# Patient Record
Sex: MALE | Race: WHITE | NOT HISPANIC OR LATINO | Employment: UNEMPLOYED | URBAN - NONMETROPOLITAN AREA
[De-identification: names, ages, dates, MRNs, and addresses within clinical notes are randomized per-mention and may not be internally consistent; named-entity substitution may affect disease eponyms.]

---

## 2017-07-20 ENCOUNTER — HISTORY (OUTPATIENT)
Dept: EMERGENCY MEDICINE | Facility: OTHER | Age: 47
End: 2017-07-20

## 2017-07-21 ENCOUNTER — AMBULATORY - GICH (OUTPATIENT)
Dept: SCHEDULING | Facility: OTHER | Age: 47
End: 2017-07-21

## 2021-10-22 ENCOUNTER — ALLIED HEALTH/NURSE VISIT (OUTPATIENT)
Dept: FAMILY MEDICINE | Facility: OTHER | Age: 51
End: 2021-10-22
Attending: FAMILY MEDICINE
Payer: COMMERCIAL

## 2021-10-22 DIAGNOSIS — R53.83 TIRES EASILY: ICD-10-CM

## 2021-10-22 DIAGNOSIS — Z20.822 COVID-19 RULED OUT: Primary | ICD-10-CM

## 2021-10-22 DIAGNOSIS — R05.9 COUGH: ICD-10-CM

## 2021-10-22 PROCEDURE — U0005 INFEC AGEN DETEC AMPLI PROBE: HCPCS | Mod: ZL

## 2021-10-22 PROCEDURE — C9803 HOPD COVID-19 SPEC COLLECT: HCPCS

## 2021-10-24 ENCOUNTER — APPOINTMENT (OUTPATIENT)
Dept: CT IMAGING | Facility: OTHER | Age: 51
End: 2021-10-24
Attending: PHYSICIAN ASSISTANT
Payer: COMMERCIAL

## 2021-10-24 ENCOUNTER — HOSPITAL ENCOUNTER (EMERGENCY)
Facility: OTHER | Age: 51
Discharge: HOME | End: 2021-10-25
Attending: PHYSICIAN ASSISTANT | Admitting: PHYSICIAN ASSISTANT
Payer: COMMERCIAL

## 2021-10-24 DIAGNOSIS — F10.921 ALCOHOL INTOXICATION WITH DELIRIUM (H): ICD-10-CM

## 2021-10-24 LAB
ALBUMIN SERPL-MCNC: 4.7 G/DL (ref 3.5–5.7)
ALBUMIN UR-MCNC: NEGATIVE MG/DL
ALP SERPL-CCNC: 89 U/L (ref 34–104)
ALT SERPL W P-5'-P-CCNC: 25 U/L (ref 7–52)
AMPHETAMINES UR QL: NOT DETECTED
ANION GAP SERPL CALCULATED.3IONS-SCNC: 16 MMOL/L (ref 3–14)
APPEARANCE UR: CLEAR
AST SERPL W P-5'-P-CCNC: 33 U/L (ref 13–39)
BARBITURATES UR QL SCN: NOT DETECTED
BASOPHILS # BLD AUTO: 0.1 10E3/UL (ref 0–0.2)
BASOPHILS NFR BLD AUTO: 1 %
BENZODIAZ UR QL SCN: NOT DETECTED
BILIRUB SERPL-MCNC: 0.2 MG/DL (ref 0.3–1)
BILIRUB UR QL STRIP: NEGATIVE
BUN SERPL-MCNC: 18 MG/DL (ref 7–25)
BUPRENORPHINE UR QL: NOT DETECTED
CALCIUM SERPL-MCNC: 9.6 MG/DL (ref 8.6–10.3)
CANNABINOIDS UR QL: ABNORMAL
CHLORIDE BLD-SCNC: 108 MMOL/L (ref 98–107)
CO2 SERPL-SCNC: 21 MMOL/L (ref 21–31)
COCAINE UR QL SCN: NOT DETECTED
COLOR UR AUTO: YELLOW
CREAT SERPL-MCNC: 0.83 MG/DL (ref 0.7–1.3)
D-METHAMPHET UR QL: NOT DETECTED
EOSINOPHIL # BLD AUTO: 0.2 10E3/UL (ref 0–0.7)
EOSINOPHIL NFR BLD AUTO: 1 %
ERYTHROCYTE [DISTWIDTH] IN BLOOD BY AUTOMATED COUNT: 13.4 % (ref 10–15)
ETHANOL SERPL-MCNC: 0.31 G/DL
GFR SERPL CREATININE-BSD FRML MDRD: >90 ML/MIN/1.73M2
GLUCOSE BLD-MCNC: 84 MG/DL (ref 70–105)
GLUCOSE UR STRIP-MCNC: NEGATIVE MG/DL
HCT VFR BLD AUTO: 46 % (ref 40–53)
HGB BLD-MCNC: 15.6 G/DL (ref 13.3–17.7)
HGB UR QL STRIP: NEGATIVE
HYALINE CASTS: 2 /LPF
IMM GRANULOCYTES # BLD: 0.1 10E3/UL
IMM GRANULOCYTES NFR BLD: 1 %
KETONES UR STRIP-MCNC: NEGATIVE MG/DL
LACTATE SERPL-SCNC: 3.2 MMOL/L (ref 0.7–2)
LEUKOCYTE ESTERASE UR QL STRIP: NEGATIVE
LIPASE SERPL-CCNC: 102 U/L (ref 11–82)
LYMPHOCYTES # BLD AUTO: 4.2 10E3/UL (ref 0.8–5.3)
LYMPHOCYTES NFR BLD AUTO: 24 %
MAGNESIUM SERPL-MCNC: 1.8 MG/DL (ref 1.9–2.7)
MCH RBC QN AUTO: 29.9 PG (ref 26.5–33)
MCHC RBC AUTO-ENTMCNC: 33.9 G/DL (ref 31.5–36.5)
MCV RBC AUTO: 88 FL (ref 78–100)
METHADONE UR QL SCN: NOT DETECTED
MONOCYTES # BLD AUTO: 1.2 10E3/UL (ref 0–1.3)
MONOCYTES NFR BLD AUTO: 7 %
MUCOUS THREADS #/AREA URNS LPF: PRESENT /LPF
NEUTROPHILS # BLD AUTO: 11.6 10E3/UL (ref 1.6–8.3)
NEUTROPHILS NFR BLD AUTO: 66 %
NITRATE UR QL: NEGATIVE
NRBC # BLD AUTO: 0 10E3/UL
NRBC BLD AUTO-RTO: 0 /100
OPIATES UR QL SCN: NOT DETECTED
OXYCODONE UR QL SCN: NOT DETECTED
PCP UR QL SCN: NOT DETECTED
PH UR STRIP: 5 [PH] (ref 5–9)
PLATELET # BLD AUTO: 257 10E3/UL (ref 150–450)
POTASSIUM BLD-SCNC: 3.7 MMOL/L (ref 3.5–5.1)
PROPOXYPH UR QL: NOT DETECTED
PROT SERPL-MCNC: 7.2 G/DL (ref 6.4–8.9)
RBC # BLD AUTO: 5.21 10E6/UL (ref 4.4–5.9)
RBC URINE: 0 /HPF
SARS-COV-2 RNA RESP QL NAA+PROBE: NEGATIVE
SARS-COV-2 RNA RESP QL NAA+PROBE: NEGATIVE
SODIUM SERPL-SCNC: 145 MMOL/L (ref 134–144)
SP GR UR STRIP: 1.01 (ref 1–1.03)
TRICYCLICS UR QL SCN: NOT DETECTED
UROBILINOGEN UR STRIP-MCNC: NORMAL MG/DL
WBC # BLD AUTO: 17.3 10E3/UL (ref 4–11)
WBC URINE: <1 /HPF

## 2021-10-24 PROCEDURE — 99285 EMERGENCY DEPT VISIT HI MDM: CPT | Mod: 25 | Performed by: PHYSICIAN ASSISTANT

## 2021-10-24 PROCEDURE — 96366 THER/PROPH/DIAG IV INF ADDON: CPT | Performed by: PHYSICIAN ASSISTANT

## 2021-10-24 PROCEDURE — 96372 THER/PROPH/DIAG INJ SC/IM: CPT | Performed by: PHYSICIAN ASSISTANT

## 2021-10-24 PROCEDURE — 82077 ASSAY SPEC XCP UR&BREATH IA: CPT | Performed by: PHYSICIAN ASSISTANT

## 2021-10-24 PROCEDURE — 83690 ASSAY OF LIPASE: CPT | Performed by: PHYSICIAN ASSISTANT

## 2021-10-24 PROCEDURE — 72125 CT NECK SPINE W/O DYE: CPT

## 2021-10-24 PROCEDURE — 99284 EMERGENCY DEPT VISIT MOD MDM: CPT | Performed by: PHYSICIAN ASSISTANT

## 2021-10-24 PROCEDURE — 81001 URINALYSIS AUTO W/SCOPE: CPT | Mod: XU | Performed by: PHYSICIAN ASSISTANT

## 2021-10-24 PROCEDURE — 80306 DRUG TEST PRSMV INSTRMNT: CPT | Performed by: PHYSICIAN ASSISTANT

## 2021-10-24 PROCEDURE — 36415 COLL VENOUS BLD VENIPUNCTURE: CPT | Performed by: PHYSICIAN ASSISTANT

## 2021-10-24 PROCEDURE — 96360 HYDRATION IV INFUSION INIT: CPT | Performed by: PHYSICIAN ASSISTANT

## 2021-10-24 PROCEDURE — C9803 HOPD COVID-19 SPEC COLLECT: HCPCS | Performed by: PHYSICIAN ASSISTANT

## 2021-10-24 PROCEDURE — 258N000003 HC RX IP 258 OP 636: Performed by: PHYSICIAN ASSISTANT

## 2021-10-24 PROCEDURE — 85025 COMPLETE CBC W/AUTO DIFF WBC: CPT | Performed by: PHYSICIAN ASSISTANT

## 2021-10-24 PROCEDURE — 250N000009 HC RX 250: Performed by: PHYSICIAN ASSISTANT

## 2021-10-24 PROCEDURE — 83735 ASSAY OF MAGNESIUM: CPT | Performed by: PHYSICIAN ASSISTANT

## 2021-10-24 PROCEDURE — 96361 HYDRATE IV INFUSION ADD-ON: CPT | Performed by: PHYSICIAN ASSISTANT

## 2021-10-24 PROCEDURE — 82040 ASSAY OF SERUM ALBUMIN: CPT | Performed by: PHYSICIAN ASSISTANT

## 2021-10-24 PROCEDURE — 96365 THER/PROPH/DIAG IV INF INIT: CPT | Performed by: PHYSICIAN ASSISTANT

## 2021-10-24 PROCEDURE — 70450 CT HEAD/BRAIN W/O DYE: CPT

## 2021-10-24 PROCEDURE — 83605 ASSAY OF LACTIC ACID: CPT | Performed by: PHYSICIAN ASSISTANT

## 2021-10-24 PROCEDURE — U0005 INFEC AGEN DETEC AMPLI PROBE: HCPCS | Performed by: PHYSICIAN ASSISTANT

## 2021-10-24 PROCEDURE — 250N000011 HC RX IP 250 OP 636: Performed by: PHYSICIAN ASSISTANT

## 2021-10-24 PROCEDURE — 96375 TX/PRO/DX INJ NEW DRUG ADDON: CPT | Performed by: PHYSICIAN ASSISTANT

## 2021-10-24 RX ORDER — KETAMINE HYDROCHLORIDE 100 MG/ML
1 INJECTION INTRAMUSCULAR; INTRAVENOUS ONCE
Status: COMPLETED | OUTPATIENT
Start: 2021-10-24 | End: 2021-10-24

## 2021-10-24 RX ORDER — OLANZAPINE 10 MG/2ML
10 INJECTION, POWDER, FOR SOLUTION INTRAMUSCULAR ONCE
Status: COMPLETED | OUTPATIENT
Start: 2021-10-24 | End: 2021-10-24

## 2021-10-24 RX ORDER — KETAMINE HYDROCHLORIDE 100 MG/ML
4 INJECTION INTRAMUSCULAR; INTRAVENOUS ONCE
Status: COMPLETED | OUTPATIENT
Start: 2021-10-24 | End: 2021-10-24

## 2021-10-24 RX ORDER — DIPHENHYDRAMINE HYDROCHLORIDE 50 MG/ML
50 INJECTION INTRAMUSCULAR; INTRAVENOUS ONCE
Status: COMPLETED | OUTPATIENT
Start: 2021-10-24 | End: 2021-10-24

## 2021-10-24 RX ORDER — MAGNESIUM SULFATE HEPTAHYDRATE 40 MG/ML
2 INJECTION, SOLUTION INTRAVENOUS ONCE
Status: COMPLETED | OUTPATIENT
Start: 2021-10-24 | End: 2021-10-24

## 2021-10-24 RX ORDER — LORAZEPAM 2 MG/ML
1 INJECTION INTRAMUSCULAR ONCE
Status: COMPLETED | OUTPATIENT
Start: 2021-10-24 | End: 2021-10-24

## 2021-10-24 RX ADMIN — LORAZEPAM 1 MG: 2 INJECTION, SOLUTION INTRAMUSCULAR; INTRAVENOUS at 16:56

## 2021-10-24 RX ADMIN — KETAMINE HYDROCHLORIDE 96 MG: 100 INJECTION, SOLUTION, CONCENTRATE INTRAMUSCULAR; INTRAVENOUS at 15:25

## 2021-10-24 RX ADMIN — KETAMINE HYDROCHLORIDE 96 MG: 100 INJECTION, SOLUTION, CONCENTRATE INTRAMUSCULAR; INTRAVENOUS at 14:20

## 2021-10-24 RX ADMIN — KETAMINE HYDROCHLORIDE 385 MG: 100 INJECTION, SOLUTION, CONCENTRATE INTRAMUSCULAR; INTRAVENOUS at 13:25

## 2021-10-24 RX ADMIN — DIPHENHYDRAMINE HYDROCHLORIDE 50 MG: 50 INJECTION INTRAMUSCULAR; INTRAVENOUS at 13:12

## 2021-10-24 RX ADMIN — OLANZAPINE 10 MG: 10 INJECTION, POWDER, FOR SOLUTION INTRAMUSCULAR at 13:12

## 2021-10-24 RX ADMIN — MAGNESIUM SULFATE HEPTAHYDRATE 2 G: 40 INJECTION, SOLUTION INTRAVENOUS at 16:34

## 2021-10-24 RX ADMIN — FOLIC ACID: 5 INJECTION, SOLUTION INTRAMUSCULAR; INTRAVENOUS; SUBCUTANEOUS at 14:18

## 2021-10-24 NOTE — PROGRESS NOTES
Pt cooperative off restraints. Resting in bed. [facility] outside room for safety. Calling local detox centers for availably.

## 2021-10-24 NOTE — PROGRESS NOTES
Pt starting to calm. Banana bag running BP high but pt often struggles when it goes off. Will monitor sats 94% on RA.  MD aware

## 2021-10-24 NOTE — PROGRESS NOTES
Behaviors continued order for behavorial restraints to be placed. Behavioral wrist and ankle restraints applied per MD order. One on one staff with patient

## 2021-10-24 NOTE — PLAN OF CARE
Problem: Restraint/Seclusion for Violent Self-Destructive Behavior  Goal: Prevent/manage potential problems during restraint/seclusion  Description: Maintain safety of patient and others during period of restraint/seclusion.  Promote psychological and physical wellbeing.  Prevent injury to skin and involved body parts.  Promote nutrition, hydration, and elimination.  10/24/2021 1721 by Linda Roberts, RN  Outcome: Completed  10/24/2021 1410 by Linda Roberts RN  Outcome: No Change  Goal: Prevent future episodes of restraint or seclusion  Description: Identify nonphysical alternatives to restraint or seclusion.  Identify additional de-escalation supportive measures to use as alternatives to restraint or seclusion.  10/24/2021 1721 by Linda Roberts, RN  Outcome: Completed  10/24/2021 1410 by Linda Roberts, RN  Outcome: No Change   Pt off restraints

## 2021-10-24 NOTE — ED NOTES
[facility] called back.  They are unable to accept patient at this time.  Their protocols state that patient's need to be 12 hours free of chemical restraints (Not time administered but time the medication will be out of their system) and 24 hours free from physical restraints.  Primary nurse notified.    Paula Chowdhury RN on 10/24/2021 at 6:59 PM

## 2021-10-24 NOTE — PROGRESS NOTES
Pt resting in bed. Drinking Orange juice. Magnesium replacement running. Sats WNL Will continue to monitor

## 2021-10-24 NOTE — PLAN OF CARE
Patient as brought in by PD with alcohol intoxication and concerns for harm, threatening staff and PD upon arrival. Yelling and refusing to follow instructions. Administered medications per order. Seclusion restraints placed for continued aggressive behavior and concern for harm.

## 2021-10-24 NOTE — ED NOTES
Pt resting quietly appears to be sleeping.  During restraint check, pt reports he needs to void.   brought to room, urinal placed, pt voided.  Pt asking why he was brought to the ER,  is explaining situation to pt. Pt speaking loudly at times but is not belligerent or inappropriate with his words except for an occasional swear word.

## 2021-10-24 NOTE — PROGRESS NOTES
Off deana to CT via stretcher.  present. Restraints removed for CT and pt more cooperative and moved self to bed. Pt tolerated CT and did well. Pt back to room via stretcher. Currently cooperative. Restraints removed

## 2021-10-24 NOTE — PROGRESS NOTES
Pt continues to holler and scream additional medication ordered per MD  in room. sats remain 95% on RA difficult to get accurate bp as pt struggles against machine

## 2021-10-24 NOTE — PROGRESS NOTES
Continues to be uncooperative and yelling at staff and PD ketamine given IM per order Will continue to monitor pt

## 2021-10-24 NOTE — ED TRIAGE NOTES
Pt arrived with PD alcohol intoxication pt hollering and screaming at staff.  and PD in room. Hand cuffs in place. Unable to complete triage information at this time

## 2021-10-24 NOTE — PLAN OF CARE
Problem: Restraint/Seclusion for Violent Self-Destructive Behavior  Goal: Prevent/manage potential problems during restraint/seclusion  Description: Maintain safety of patient and others during period of restraint/seclusion.  Promote psychological and physical wellbeing.  Prevent injury to skin and involved body parts.  Promote nutrition, hydration, and elimination.  Outcome: No Change  Goal: Prevent future episodes of restraint or seclusion  Description: Identify nonphysical alternatives to restraint or seclusion.  Identify additional de-escalation supportive measures to use as alternatives to restraint or seclusion.  Outcome: No Change

## 2021-10-25 VITALS
HEART RATE: 107 BPM | DIASTOLIC BLOOD PRESSURE: 113 MMHG | SYSTOLIC BLOOD PRESSURE: 177 MMHG | BODY MASS INDEX: 28.35 KG/M2 | TEMPERATURE: 99.2 F | WEIGHT: 212.08 LBS | OXYGEN SATURATION: 95 % | RESPIRATION RATE: 17 BRPM

## 2021-10-25 LAB — LACTATE SERPL-SCNC: 2.8 MMOL/L (ref 0.7–2)

## 2021-10-25 PROCEDURE — 36415 COLL VENOUS BLD VENIPUNCTURE: CPT | Performed by: EMERGENCY MEDICINE

## 2021-10-25 PROCEDURE — 83605 ASSAY OF LACTIC ACID: CPT | Performed by: EMERGENCY MEDICINE
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
## 2021-10-25 NOTE — ED NOTES
Patient requests up to bathroom to have BM.  Very unsteady on his feet.  One to assist another aide on standby.

## 2021-10-25 NOTE — DISCHARGE INSTRUCTIONS
Follow up with your primary doctor regarding elevated blood pressure measurements. You may require medication for this.  You are at risk for alcohol withdrawal.  This is very dangerous as it can cause seizures and delirium, complications of which can even lead to cardiac arrest and death.  You are recommended to have Ativan prior to discharge but you have declined.  You may go into alcohol withdrawal unless you drink alcohol today.  Please seek medical attention immediately if you develop any symptoms of withdrawal such as severe vomiting, severe tremors, skin crawling/itching, visual disturbances or hallucinations, loss of consciousness or seizure, or any other concerns.  Please consider detox if you desire to quit drinking in the future  [facility]: [phone]  Rule 25 assessment: [phone]

## 2021-10-25 NOTE — PROGRESS NOTES
Pt resting in bed. Denies pain used urinal sitter outside room for safety. Cooperative at this time. Vitals stable

## 2021-10-25 NOTE — ED NOTES
Patient is alert and orientated and wanting to go home with no detox. Refuses ativan at this time. And seems to understand the potential complications that can happen with withdrawals.

## 2021-10-25 NOTE — ED PROVIDER NOTES
"ED Course as of Oct 25 0652   Mon Oct 25, 2021   0035 Patient signed out to me: agitated delirium brought in by police. Received ketamine and zyprexa. Negative head/c-spine CT.  Ethanol elevated, likely responsible for lactate elevation. Vitals unremarkable. On a hold currently. Attempted to contact detox but no beds. Is improving. Reevaluate in AM and send to detox vs discharge for clinical sobriety.      0624 Patient is alert and conversant, ambulatory with steady gait, does not recall what happened last night.  I explained the situation that he was brought in for agitation and intoxication.  He is having some jerky muscle movements, mild tachycardia, mild hypertension.  I discussed possibility of detox and he says he cannot go today and he has things to do.  He is staying at a local motel.  Discussed concern for alcohol withdrawal and recommended a dose of Ativan he declined.  He acknowledged the risk of seizure, hallucinations, aspiration, cardiac arrest, and says that he understands the risks and \"will sign anything\" but does not want Ativan.  Offered some numbers for detox facilities, will provide cab  voucher, will provide follow-up appointment.  Best phone number [phone]           Final Diagnosis: alcohol intoxication  Chio Gray MD  10/25/21 0647       Chio Gray MD  10/25/21 0652       Chio Gray MD  11/06/21 1925    "

## 2021-10-25 NOTE — ED NOTES
Patient has rested quietly most of night and has been calm and cooperative with staff. MD aware of high BP.

## 2021-10-28 ENCOUNTER — ALLIED HEALTH/NURSE VISIT (OUTPATIENT)
Dept: FAMILY MEDICINE | Facility: OTHER | Age: 51
End: 2021-10-28
Attending: FAMILY MEDICINE
Payer: COMMERCIAL

## 2021-10-28 DIAGNOSIS — Z20.822 COVID-19 RULED OUT: Primary | ICD-10-CM

## 2021-10-28 PROCEDURE — U0003 INFECTIOUS AGENT DETECTION BY NUCLEIC ACID (DNA OR RNA); SEVERE ACUTE RESPIRATORY SYNDROME CORONAVIRUS 2 (SARS-COV-2) (CORONAVIRUS DISEASE [COVID-19]), AMPLIFIED PROBE TECHNIQUE, MAKING USE OF HIGH THROUGHPUT TECHNOLOGIES AS DESCRIBED BY CMS-2020-01-R: HCPCS | Mod: ZL

## 2021-10-28 PROCEDURE — C9803 HOPD COVID-19 SPEC COLLECT: HCPCS

## 2021-10-30 LAB — SARS-COV-2 RNA RESP QL NAA+PROBE: NEGATIVE

## 2021-11-03 PROBLEM — M16.11 PRIMARY OSTEOARTHRITIS OF RIGHT HIP: Status: ACTIVE | Noted: 2021-03-05

## 2021-11-03 PROBLEM — M16.12 PRIMARY OSTEOARTHRITIS OF LEFT HIP: Status: ACTIVE | Noted: 2021-03-05

## 2021-11-03 PROBLEM — Z96.642 HISTORY OF TOTAL LEFT HIP REPLACEMENT: Status: ACTIVE | Noted: 2021-08-14

## 2021-11-03 NOTE — PROGRESS NOTES
SUBJECTIVE:   CC: Bhaskar Zurita is an 51 year old male who presents for preventative health visit.       Patient has been advised of split billing requirements and indicates understanding: Yes  HPI  Here for intake physical for [facility]. Currently in inpatient treatment for alcohol abuse. He was in detox prior to this. He reports he does not have any concerns today. He does have a history of hypertension that has been well managed without medications. He was in the ED on 10/24 due to alcoholic intoxication where complete work-up was done and was stable. He is requesting no labs to be completed today due to recent lab work in ED.      STD concerns: No   Cholesterol/DM concerns/screening: Due, patient declines  Prostate cancer screening discussed: Not indicated, patient is average risk and younger than 50.  Family history of colon or prostate CA?: No  Colonoscopy: Due, patient will consider completion when out of treatment   Tobacco use: 1.2-1 ppd, no interest in quitting.   Immunizations: Declined influenza and COVID    Today's PHQ-2 Score:   PHQ-2 ( 1999 Pfizer) 11/4/2021   Q1: Little interest or pleasure in doing things 0   Q2: Feeling down, depressed or hopeless 0   PHQ-2 Score 0       Do you feel safe in your environment? Yes    Have you ever done Advance Care Planning? (For example, a Health Directive, POLST, or a discussion with a medical provider or your loved ones about your wishes): No, advance care planning information given to patient to review.  Patient declined advance care planning discussion at this time.    Social History     Tobacco Use     Smoking status: Current Every Day Smoker     Packs/day: 0.50     Types: Cigarettes     Smokeless tobacco: Current User     Types: Chew, Snuff     Tobacco comment: Quit smoking: Chews Occaionally   Substance Use Topics     Alcohol use: Yes     Alcohol/week: 14.0 standard drinks     Comment: entered treatment 11/2/2021         Last PSA: No results found  "for: PSA    Reviewed orders with patient. Reviewed health maintenance and updated orders accordingly - Yes      Reviewed and updated as needed this visit by clinical staff  Tobacco  Allergies  Meds      Soc Hx        Reviewed and updated as needed this visit by Provider                No past medical history on file.   No past surgical history on file.    Review of Systems  CONSTITUTIONAL: NEGATIVE for fever, chills, change in weight  INTEGUMENTARY/SKIN: NEGATIVE for worrisome rashes, moles or lesions  EYES: NEGATIVE for vision changes or irritation  ENT: NEGATIVE for ear, mouth and throat problems  RESP: NEGATIVE for significant cough or SOB  CV: NEGATIVE for chest pain, palpitations or peripheral edema  GI: NEGATIVE for nausea, abdominal pain, heartburn, or change in bowel habits   male: negative for dysuria, hematuria, decreased urinary stream, erectile dysfunction, urethral discharge  MUSCULOSKELETAL: NEGATIVE for significant arthralgias or myalgia  NEURO: NEGATIVE for weakness, dizziness or paresthesias  PSYCHIATRIC: NEGATIVE for changes in mood or affect    OBJECTIVE:   /80   Pulse 56   Temp 97.5  F (36.4  C)   Resp 16   Ht 1.88 m (6' 2\")   Wt 103 kg (227 lb)   SpO2 97%   BMI 29.15 kg/m      Physical Exam  GENERAL: healthy, alert and no distress  EYES: Eyes grossly normal to inspection, PERRL and conjunctivae and sclerae normal  HENT: ear canals and TM's normal, nose and mouth without ulcers or lesions  NECK: no adenopathy, no asymmetry, masses, or scars and thyroid normal to palpation  RESP: lungs clear to auscultation - no rales, rhonchi or wheezes  CV: regular rate and rhythm, normal S1 S2, no S3 or S4, no murmur, click or rub, no peripheral edema and peripheral pulses strong  MS: no gross musculoskeletal defects noted, no edema  SKIN: no suspicious lesions or rashes  NEURO: Normal strength and tone, mentation intact and speech normal  PSYCH: mentation appears normal, affect " "normal/bright    Diagnostic Test Results:  Labs reviewed in Epic    ASSESSMENT/PLAN:       ICD-10-CM    1. Routine general medical examination at a health care facility  Z00.00    2. Alcohol abuse  F10.10    3. Hypertension, unspecified type  I10    4. Tobacco abuse  Z72.0      1, 2. Up-to-date on lab work, completed in ED on 10/24 and was stable. Patient is declining any repeat lab for follow-up. Patient declining in immunizations and updating screenings today. Encourage healthy diet and exercise.    3. History of hypertension that has been managed with lifestyle. Within goals today. Encouraged to continue monitoring while in treatment.    4. Continues smoking 1/2 to 1 pack/day. Discussed cessation but patient is not interested at this time.    Patient has been advised of split billing requirements and indicates understanding: Yes  COUNSELING:   Reviewed preventive health counseling, as reflected in patient instructions    Estimated body mass index is 29.15 kg/m  as calculated from the following:    Height as of this encounter: 1.88 m (6' 2\").    Weight as of this encounter: 103 kg (227 lb).     Weight management plan: Discussed healthy diet and exercise guidelines    He reports that he has been smoking cigarettes. He has been smoking about 0.50 packs per day. His smokeless tobacco use includes chew and snuff.  Tobacco Cessation Action Plan:   Information offered: Patient not interested at this time      Counseling Resources:  ATP IV Guidelines  Pooled Cohorts Equation Calculator  FRAX Risk Assessment  ICSI Preventive Guidelines  Dietary Guidelines for Americans, 2010  USDA's MyPlate  ASA Prophylaxis  Lung CA Screening    Ashley Ferguson PA-C  [facility] [place]  "

## 2021-11-04 ENCOUNTER — OFFICE VISIT (OUTPATIENT)
Dept: FAMILY MEDICINE | Facility: OTHER | Age: 51
End: 2021-11-04
Attending: PHYSICIAN ASSISTANT
Payer: COMMERCIAL

## 2021-11-04 VITALS
BODY MASS INDEX: 29.13 KG/M2 | WEIGHT: 227 LBS | SYSTOLIC BLOOD PRESSURE: 136 MMHG | TEMPERATURE: 97.5 F | HEART RATE: 56 BPM | HEIGHT: 74 IN | OXYGEN SATURATION: 97 % | DIASTOLIC BLOOD PRESSURE: 80 MMHG | RESPIRATION RATE: 16 BRPM

## 2021-11-04 DIAGNOSIS — F10.10 ALCOHOL ABUSE: ICD-10-CM

## 2021-11-04 DIAGNOSIS — Z72.0 TOBACCO ABUSE: ICD-10-CM

## 2021-11-04 DIAGNOSIS — Z00.00 ROUTINE GENERAL MEDICAL EXAMINATION AT A HEALTH CARE FACILITY: Primary | ICD-10-CM

## 2021-11-04 DIAGNOSIS — I10 HYPERTENSION, UNSPECIFIED TYPE: ICD-10-CM

## 2021-11-04 PROCEDURE — G0463 HOSPITAL OUTPT CLINIC VISIT: HCPCS

## 2021-11-04 PROCEDURE — 99396 PREV VISIT EST AGE 40-64: CPT | Performed by: PHYSICIAN ASSISTANT

## 2021-11-04 ASSESSMENT — ANXIETY QUESTIONNAIRES
1. FEELING NERVOUS, ANXIOUS, OR ON EDGE: NOT AT ALL
5. BEING SO RESTLESS THAT IT IS HARD TO SIT STILL: NOT AT ALL
GAD7 TOTAL SCORE: 0
3. WORRYING TOO MUCH ABOUT DIFFERENT THINGS: NOT AT ALL
IF YOU CHECKED OFF ANY PROBLEMS ON THIS QUESTIONNAIRE, HOW DIFFICULT HAVE THESE PROBLEMS MADE IT FOR YOU TO DO YOUR WORK, TAKE CARE OF THINGS AT HOME, OR GET ALONG WITH OTHER PEOPLE: NOT DIFFICULT AT ALL
2. NOT BEING ABLE TO STOP OR CONTROL WORRYING: NOT AT ALL
7. FEELING AFRAID AS IF SOMETHING AWFUL MIGHT HAPPEN: NOT AT ALL
6. BECOMING EASILY ANNOYED OR IRRITABLE: NOT AT ALL

## 2021-11-04 ASSESSMENT — MIFFLIN-ST. JEOR: SCORE: 1964.42

## 2021-11-04 ASSESSMENT — PATIENT HEALTH QUESTIONNAIRE - PHQ9: 5. POOR APPETITE OR OVEREATING: NOT AT ALL

## 2021-11-04 ASSESSMENT — PAIN SCALES - GENERAL: PAINLEVEL: MILD PAIN (3)

## 2021-11-04 NOTE — NURSING NOTE
Patient presents to clinic with intake physical for [facility].  Rupinder Goins LPN ....................  11/4/2021   3:24 PM

## 2021-11-05 ASSESSMENT — ANXIETY QUESTIONNAIRES: GAD7 TOTAL SCORE: 0

## 2022-01-03 ENCOUNTER — OFFICE VISIT (OUTPATIENT)
Dept: FAMILY MEDICINE | Facility: OTHER | Age: 52
End: 2022-01-03
Attending: FAMILY MEDICINE
Payer: COMMERCIAL

## 2022-01-03 VITALS
TEMPERATURE: 97.8 F | RESPIRATION RATE: 18 BRPM | BODY MASS INDEX: 29.79 KG/M2 | WEIGHT: 232 LBS | DIASTOLIC BLOOD PRESSURE: 78 MMHG | OXYGEN SATURATION: 96 % | SYSTOLIC BLOOD PRESSURE: 124 MMHG | HEART RATE: 78 BPM

## 2022-01-03 DIAGNOSIS — M16.11 PRIMARY OSTEOARTHRITIS OF RIGHT HIP: Primary | ICD-10-CM

## 2022-01-03 DIAGNOSIS — F10.20 ALCOHOLISM (H): ICD-10-CM

## 2022-01-03 DIAGNOSIS — F40.240 CLAUSTROPHOBIA: ICD-10-CM

## 2022-01-03 PROCEDURE — G0463 HOSPITAL OUTPT CLINIC VISIT: HCPCS

## 2022-01-03 PROCEDURE — 99213 OFFICE O/P EST LOW 20 MIN: CPT | Performed by: FAMILY MEDICINE

## 2022-01-03 RX ORDER — ETODOLAC 500 MG
500 TABLET ORAL 2 TIMES DAILY
Qty: 60 TABLET | Refills: 11 | Status: SHIPPED | OUTPATIENT
Start: 2022-01-03 | End: 2022-01-31

## 2022-01-03 RX ORDER — DULOXETIN HYDROCHLORIDE 60 MG/1
CAPSULE, DELAYED RELEASE ORAL
COMMUNITY
Start: 2021-12-14 | End: 2024-01-12

## 2022-01-03 ASSESSMENT — PAIN SCALES - GENERAL: PAINLEVEL: MILD PAIN (2)

## 2022-01-03 NOTE — PROGRESS NOTES
"Nursing Notes:   Larissa Torres LPN  1/3/2022  9:46 AM  Signed  Chief Complaint   Patient presents with     Patient Request for Note/Letter     needs a note for [place] counseling as unable to wear a mask       Initial /78   Pulse 78   Temp 97.8  F (36.6  C) (Tympanic)   Resp 18   Wt 105.2 kg (232 lb)   SpO2 96%   BMI 29.79 kg/m   Estimated body mass index is 29.79 kg/m  as calculated from the following:    Height as of 11/4/21: 1.88 m (6' 2\").    Weight as of this encounter: 105.2 kg (232 lb).  Medication Reconciliation: complete    FOOD SECURITY SCREENING QUESTIONS  Hunger Vital Signs:  Within the past 12 months we worried whether our food would run out before we got money to buy more. Never  Within the past 12 months the food we bought just didn't last and we didn't have money to get more. Never    Larissa Torres LPN    SUBJECTIVE:  Bhaskar Zurita  is a 51 year old male who comes in today stating that he needs a note for an order form because he is unable to wear a mask.  He was seen for physical on November 4.  He had Covid vaccine on December 6 and 28.  He has not had a flu shot.  He is in treatment at [facility] and has a hard time wearing a mask because he starts to hyperventilate as he feels claustrophobic.  Looking for a note or other options.    Has some arthritis in his right hip and also had a left hip replacement within the last year.  Wonders about something for arthritis.    Past Medical, Family, and Social History reviewed and updated as noted below.   ROS is negative except as noted above       Allergies   Allergen Reactions     Penicillins Rash     Red blotches  Red blotches     , History reviewed. No pertinent family history.,   Current Outpatient Medications   Medication     DULoxetine (CYMBALTA) 60 MG capsule     etodolac (LODINE) 500 MG tablet     No current facility-administered medications for this visit.   , History reviewed. No pertinent past medical history., "   Patient Active Problem List    Diagnosis Date Noted     Alcoholism (H) 01/03/2022     Priority: Medium     Claustrophobia 01/03/2022     Priority: Medium     History of total left hip replacement 08/14/2021     Priority: Medium     Primary osteoarthritis of left hip 03/05/2021     Priority: Medium     Primary osteoarthritis of right hip 03/05/2021     Priority: Medium     Osteoarthritis of right knee 02/26/2014     Priority: Medium     Nonallopathic lesion of pelvic region 06/04/2012     Priority: Medium     Nonallopathic lesion of thoracic region 06/04/2012     Priority: Medium     Sprain of lumbar region 06/04/2012     Priority: Medium     Backache 05/24/2012     Priority: Medium     Alcohol abuse, episodic 05/16/2012     Priority: Medium     Hypertension 05/16/2012     Priority: Medium     Knee pain 05/16/2012     Priority: Medium   , History reviewed. No pertinent surgical history. and   Social History     Tobacco Use     Smoking status: Current Every Day Smoker     Packs/day: 0.50     Types: Cigarettes     Smokeless tobacco: Current User     Types: Chew, Snuff     Tobacco comment: Quit smoking: Chews Occaionally   Substance Use Topics     Alcohol use: Yes     Alcohol/week: 14.0 standard drinks     Comment: entered treatment 11/2/2021     OBJECTIVE:  /78   Pulse 78   Temp 97.8  F (36.6  C) (Tympanic)   Resp 18   Wt 105.2 kg (232 lb)   SpO2 96%   BMI 29.79 kg/m     EXAM:  Alert and cooperative, no distress.  He has a bit of a tremor.  Left hip anterior approach to arthroplasty incision appears to be well-healed.  He has a prominence over the left hip that may be a organizing hematoma but is not tender.  ASSESSMENT/Plan :    Bhaskar was seen today for patient request for note/letter.    Diagnoses and all orders for this visit:    Primary osteoarthritis of right hip  -     etodolac (LODINE) 500 MG tablet; Take 1 tablet (500 mg) by mouth 2 times daily    Claustrophobia    Alcoholism (H)      Placed on  etodolac for his arthritis.    Discussed options for him for a face covering.  He could use a mask that was tied at the top and not at the bottom to try and lessen the claustrophobic sensation.  We gave him 1 to use.  He could also try a neck gaiter or a bandanna.  Face shield might be another option if none of the above are tolerated.    Christopher Benito MD

## 2022-01-03 NOTE — NURSING NOTE
"Chief Complaint   Patient presents with     Patient Request for Note/Letter     needs a note for [place] counseling as unable to wear a mask       Initial /78   Pulse 78   Temp 97.8  F (36.6  C) (Tympanic)   Resp 18   Wt 105.2 kg (232 lb)   SpO2 96%   BMI 29.79 kg/m   Estimated body mass index is 29.79 kg/m  as calculated from the following:    Height as of 11/4/21: 1.88 m (6' 2\").    Weight as of this encounter: 105.2 kg (232 lb).  Medication Reconciliation: complete    FOOD SECURITY SCREENING QUESTIONS  Hunger Vital Signs:  Within the past 12 months we worried whether our food would run out before we got money to buy more. Never  Within the past 12 months the food we bought just didn't last and we didn't have money to get more. Never    Larissa Torres, [place]  "

## 2022-01-31 ENCOUNTER — OFFICE VISIT (OUTPATIENT)
Dept: FAMILY MEDICINE | Facility: OTHER | Age: 52
End: 2022-01-31
Attending: FAMILY MEDICINE
Payer: COMMERCIAL

## 2022-01-31 VITALS
OXYGEN SATURATION: 97 % | WEIGHT: 231 LBS | RESPIRATION RATE: 18 BRPM | SYSTOLIC BLOOD PRESSURE: 120 MMHG | HEART RATE: 84 BPM | DIASTOLIC BLOOD PRESSURE: 78 MMHG | BODY MASS INDEX: 29.65 KG/M2 | HEIGHT: 74 IN | TEMPERATURE: 98 F

## 2022-01-31 DIAGNOSIS — M25.551 HIP PAIN, RIGHT: Primary | ICD-10-CM

## 2022-01-31 PROCEDURE — 250N000011 HC RX IP 250 OP 636: Performed by: FAMILY MEDICINE

## 2022-01-31 PROCEDURE — G0463 HOSPITAL OUTPT CLINIC VISIT: HCPCS

## 2022-01-31 PROCEDURE — 99213 OFFICE O/P EST LOW 20 MIN: CPT | Mod: 25 | Performed by: FAMILY MEDICINE

## 2022-01-31 PROCEDURE — 96372 THER/PROPH/DIAG INJ SC/IM: CPT | Performed by: FAMILY MEDICINE

## 2022-01-31 PROCEDURE — 20610 DRAIN/INJ JOINT/BURSA W/O US: CPT | Performed by: FAMILY MEDICINE

## 2022-01-31 RX ORDER — MELOXICAM 7.5 MG/1
7.5-15 TABLET ORAL DAILY
Qty: 60 TABLET | Refills: 3 | Status: SHIPPED | OUTPATIENT
Start: 2022-01-31 | End: 2024-01-12

## 2022-01-31 RX ORDER — TRIAMCINOLONE ACETONIDE 40 MG/ML
40 INJECTION, SUSPENSION INTRA-ARTICULAR; INTRAMUSCULAR ONCE
Status: COMPLETED | OUTPATIENT
Start: 2022-01-31 | End: 2022-01-31

## 2022-01-31 RX ADMIN — TRIAMCINOLONE ACETONIDE 40 MG: 40 INJECTION, SUSPENSION INTRA-ARTICULAR; INTRAMUSCULAR at 14:18

## 2022-01-31 ASSESSMENT — MIFFLIN-ST. JEOR: SCORE: 1982.56

## 2022-01-31 NOTE — NURSING NOTE
Patient presents today for right hip pain and cortisone injection.    Medication Reconciliation Complete    Grace Valle LPN  1/31/2022 1:59 PM

## 2022-01-31 NOTE — PROGRESS NOTES
"SUBJECTIVE:  Bhaskar Zurita is a 51 year old male here for right hip pain.  He has been seen previously by orthopedics in the past for right hip pain that has been felt to be due to trochanteric bursitis.  He is seen in October where steroid injection to his trochanteric bursa provided some relief.  He is here today requesting repeat injection.  He does not recall any new injuries or trauma that brought this on.    He continues to have pain especially in his anterior thighs bilaterally.  He has been using etodolac 500 mg twice daily and wonders if he can increase this.    ROS:    As above otherwise ROS is unremarkable.    OBJECTIVE:  /78   Pulse 84   Temp 98  F (36.7  C)   Resp 18   Ht 1.88 m (6' 2\")   Wt 104.8 kg (231 lb)   SpO2 97%   BMI 29.66 kg/m      EXAM:  General Appearance: Pleasant, alert, appropriate appearance for age. No acute distress  Musculoskeletal: Right hip shows flexion to 90 degrees which reduces pain both laterally and anteriorly.  Internal Tatian 10 degrees, external Tatian 45 degrees.  He has tenderness palpation over the greater trochanter that reproduces his pain.  Pain with logrolling.  Skin: No bruising.  Neurologic Exam: Normal distal sensation to light touch.    ASSESSEMENT AND PLAN:    1. Hip pain, right      Outside records were reviewed which shows steroid injection in October.  X-rays were performed on their and read as showing minimal arthritis however actual images are not available.  We discussed options at this time he is requesting repeat bursal injection.  Verbal informed consent was obtained.  His right lateral hip was cleansed in normal fashion.  A 5 mL mixture of 40 mg of Kenalog and lidocaine were used to infiltrate his trochanteric bursa in a fanlike fashion.  He tolerated this well, no immediate complications.    If his anterior thigh pain is continuing despite improvement in his right lateral hip pain he should return for reassessment including consideration " of labs.  Discussed that he is already on his maximum dose of etodolac so we will have him stop that and start meloxicam 7.5 to 15 mg once daily with food.  Potential side effects were discussed.    Vasquez Perez MD  Family Medicine

## 2022-02-07 ENCOUNTER — ALLIED HEALTH/NURSE VISIT (OUTPATIENT)
Dept: FAMILY MEDICINE | Facility: OTHER | Age: 52
End: 2022-02-07
Attending: FAMILY MEDICINE
Payer: COMMERCIAL

## 2022-02-07 DIAGNOSIS — Z20.822 COVID-19 RULED OUT: Primary | ICD-10-CM

## 2022-02-07 PROCEDURE — C9803 HOPD COVID-19 SPEC COLLECT: HCPCS

## 2022-02-07 PROCEDURE — U0003 INFECTIOUS AGENT DETECTION BY NUCLEIC ACID (DNA OR RNA); SEVERE ACUTE RESPIRATORY SYNDROME CORONAVIRUS 2 (SARS-COV-2) (CORONAVIRUS DISEASE [COVID-19]), AMPLIFIED PROBE TECHNIQUE, MAKING USE OF HIGH THROUGHPUT TECHNOLOGIES AS DESCRIBED BY CMS-2020-01-R: HCPCS | Mod: ZL

## 2022-02-08 LAB
SARS-COV-2 RNA RESP QL NAA+PROBE: NORMAL
SARS-COV-2 RNA RESP QL NAA+PROBE: NOT DETECTED

## 2022-03-16 ENCOUNTER — OFFICE VISIT (OUTPATIENT)
Dept: FAMILY MEDICINE | Facility: OTHER | Age: 52
End: 2022-03-16
Attending: STUDENT IN AN ORGANIZED HEALTH CARE EDUCATION/TRAINING PROGRAM
Payer: COMMERCIAL

## 2022-03-16 VITALS
TEMPERATURE: 97.4 F | HEART RATE: 95 BPM | OXYGEN SATURATION: 96 % | BODY MASS INDEX: 28.91 KG/M2 | SYSTOLIC BLOOD PRESSURE: 134 MMHG | DIASTOLIC BLOOD PRESSURE: 80 MMHG | WEIGHT: 225.2 LBS | RESPIRATION RATE: 18 BRPM

## 2022-03-16 DIAGNOSIS — Z71.1 FEARED COMPLAINT WITHOUT DIAGNOSIS: Primary | ICD-10-CM

## 2022-03-16 PROCEDURE — 99212 OFFICE O/P EST SF 10 MIN: CPT | Performed by: STUDENT IN AN ORGANIZED HEALTH CARE EDUCATION/TRAINING PROGRAM

## 2022-03-16 PROCEDURE — G0463 HOSPITAL OUTPT CLINIC VISIT: HCPCS

## 2022-03-16 ASSESSMENT — PAIN SCALES - GENERAL: PAINLEVEL: MODERATE PAIN (4)

## 2022-03-16 NOTE — NURSING NOTE
Chief Complaint   Patient presents with     Medication Request     Is requesting medications for his Lymes. States that he was dx in 2014. States that he has been fatigued lately with increased joint pain.     Medication Reconciliation: complete    Kelsie Enriquez LPN

## 2022-03-16 NOTE — PROGRESS NOTES
"  Assessment & Plan     Feared complaint without diagnosis  Patient requesting antibiotic injection for worry about chronic Lyme disease. Was diagnosed in 2014 and I did tell him that from chart review it looks like he completed the appropriate treatment course of doxycycline. I told him that an antibiotic injection is not medically indicated. He then said there was \"no point to come in then\" and abruptly left.     Toro Mandel MD  [facility]   Bhaskar is a 51 year old who presents for the following health issues     HPI   \"I want a shot of minoxicillin\"   - tells me he had Lyme disease in 2014 and is now having flare up of the disease  - says he is having fatigue, joint pain, and that it is flaring  - per chart review was diagnosed in 2014 and completed doxycyline course  - he read about Lyme disease and said the medical field does not know enough about it  - taking advil and it is not helping so he wants the next stop \"the antibiotic shot\"           Review of Systems         Objective    /80 (BP Location: Right arm, Patient Position: Sitting, Cuff Size: Adult Regular)   Pulse 95   Temp 97.4  F (36.3  C) (Tympanic)   Resp 18   Wt 102.2 kg (225 lb 3.2 oz)   SpO2 96%   BMI 28.91 kg/m    Body mass index is 28.91 kg/m .  Physical Exam   General: refused to wear mask   Psych: irritated                 "
Detail Level: Zone
Detail Level: Detailed

## 2022-08-30 ENCOUNTER — OFFICE VISIT (OUTPATIENT)
Dept: FAMILY MEDICINE | Facility: OTHER | Age: 52
End: 2022-08-30
Attending: FAMILY MEDICINE
Payer: COMMERCIAL

## 2022-08-30 ENCOUNTER — HOSPITAL ENCOUNTER (OUTPATIENT)
Dept: GENERAL RADIOLOGY | Facility: OTHER | Age: 52
Discharge: HOME | End: 2022-08-30
Attending: FAMILY MEDICINE
Payer: COMMERCIAL

## 2022-08-30 VITALS
OXYGEN SATURATION: 96 % | SYSTOLIC BLOOD PRESSURE: 134 MMHG | BODY MASS INDEX: 29.03 KG/M2 | DIASTOLIC BLOOD PRESSURE: 88 MMHG | HEIGHT: 73 IN | HEART RATE: 86 BPM | TEMPERATURE: 96.8 F | WEIGHT: 219 LBS | RESPIRATION RATE: 16 BRPM

## 2022-08-30 DIAGNOSIS — M25.551 GREATER TROCHANTERIC PAIN SYNDROME OF RIGHT LOWER EXTREMITY: Primary | ICD-10-CM

## 2022-08-30 DIAGNOSIS — M25.851 FEMOROACETABULAR IMPINGEMENT OF RIGHT HIP: ICD-10-CM

## 2022-08-30 DIAGNOSIS — Z96.642 HISTORY OF TOTAL HIP ARTHROPLASTY, LEFT: ICD-10-CM

## 2022-08-30 PROCEDURE — G0463 HOSPITAL OUTPT CLINIC VISIT: HCPCS | Mod: 25

## 2022-08-30 PROCEDURE — 250N000009 HC RX 250: Performed by: FAMILY MEDICINE

## 2022-08-30 PROCEDURE — 20610 DRAIN/INJ JOINT/BURSA W/O US: CPT | Performed by: FAMILY MEDICINE

## 2022-08-30 PROCEDURE — G0463 HOSPITAL OUTPT CLINIC VISIT: HCPCS

## 2022-08-30 PROCEDURE — 73502 X-RAY EXAM HIP UNI 2-3 VIEWS: CPT

## 2022-08-30 PROCEDURE — 99214 OFFICE O/P EST MOD 30 MIN: CPT | Mod: 25 | Performed by: FAMILY MEDICINE

## 2022-08-30 PROCEDURE — 250N000011 HC RX IP 250 OP 636: Performed by: FAMILY MEDICINE

## 2022-08-30 RX ORDER — IBUPROFEN 200 MG
200 TABLET ORAL EVERY 4 HOURS PRN
COMMUNITY
End: 2024-01-12

## 2022-08-30 RX ORDER — DEXAMETHASONE SODIUM PHOSPHATE 10 MG/ML
10 INJECTION, SOLUTION INTRAMUSCULAR; INTRAVENOUS ONCE
Status: COMPLETED | OUTPATIENT
Start: 2022-08-30 | End: 2022-08-30

## 2022-08-30 RX ORDER — LIDOCAINE HYDROCHLORIDE 10 MG/ML
5 INJECTION, SOLUTION EPIDURAL; INFILTRATION; INTRACAUDAL; PERINEURAL ONCE
Status: COMPLETED | OUTPATIENT
Start: 2022-08-30 | End: 2022-08-30

## 2022-08-30 RX ADMIN — LIDOCAINE HYDROCHLORIDE 5 ML: 10 INJECTION, SOLUTION INFILTRATION; PERINEURAL at 10:57

## 2022-08-30 RX ADMIN — DEXAMETHASONE SODIUM PHOSPHATE 10 MG: 10 INJECTION, SOLUTION INTRAMUSCULAR; INTRAVENOUS at 10:57

## 2022-08-30 ASSESSMENT — PAIN SCALES - GENERAL: PAINLEVEL: SEVERE PAIN (6)

## 2022-08-30 NOTE — NURSING NOTE
"Chief Complaint   Patient presents with     Hip Pain     Right hip pain, ongoing for 9 months     Patient presents for right hip pain ongoing for 9 months. Patient believes that left hip replacement caused right hip problems to worsen. Pain 6/10. Last cortisone injection in right hip: 1/31/22 with Dr. Perez. Patient states \"helped\" with the pain for a couple of months.     Initial /88 (BP Location: Right arm, Patient Position: Sitting, Cuff Size: Adult Regular)   Pulse 86   Temp 96.8  F (36  C) (Tympanic)   Resp 16   Ht 1.842 m (6' 0.5\")   Wt 99.3 kg (219 lb)   SpO2 96%   BMI 29.29 kg/m   Estimated body mass index is 29.29 kg/m  as calculated from the following:    Height as of this encounter: 1.842 m (6' 0.5\").    Weight as of this encounter: 99.3 kg (219 lb).       Medication Reconciliation: Complete    Neelam Martines LPN .......  8/30/2022  10:13 AM   "

## 2022-08-30 NOTE — PROGRESS NOTES
"Sports Medicine Office Note    HPI:  52-year-old male coming in for evaluation of right hip pain.  His pain began in October or November 2021.  He had a total hip replacement on his left hip in August 2021 and feels that his symptoms developed 2-3 months after that due to an altered gait.  He was seen by another provider on 1/31/2022 and received an injection into the right greater trochanteric bursa.  He reports several months of pain relief with return of his symptoms.  His current pain today is localized to the lateral aspect of the hip.  He rates his pain 6/10.  He characterizes the pain as achy.  He has difficulty with bending, squatting, going downstairs, and most upright/weightbearing activities.  He is using ibuprofen on a daily basis.      EXAM:  /88 (BP Location: Right arm, Patient Position: Sitting, Cuff Size: Adult Regular)   Pulse 86   Temp 96.8  F (36  C) (Tympanic)   Resp 16   Ht 1.842 m (6' 0.5\")   Wt 99.3 kg (219 lb)   SpO2 96%   BMI 29.29 kg/m    MUSCULOSKELETAL EXAM:  RIGHT HIP  Inspection:  -No gross deformity    Tenderness to palpation of the:  -Anterior hip joint: Positive  -ASIS:  Negative  -Greater trochanter: Positive  -Pelvic ala:  Negative  -Gluteus medius/minimus tendinous insertion: Mild pain    Range of Motion:  -Passive flexion:  120 with pain at endrange  -Passive external rotation:  60  -Passive internal rotation:  15    Strength:  -Knee extension:  5/5  -Knee flexion:  5/5  -Hip abduction:  5/5  -Hip adduction:  5/5  -Hip flexion:  5/5  -Hip extension:  5/5    Special Tests:  -Logrolling maneuver:  Negative  -ALYSSA: Positive  -FADIR: Positive  -Scour test: Positive  -Stinchfield test: Positive    Other:  -Intact sensation to light touch distally.  -No signs of cyanosis. Normal skin temperature of the lower extremity.  -Knee/foot/ankle:  No gross deformity. Full range of motion.  -Left hip:  No gross deformity. No palpable tenderness. Normal strength and " ROM.      IMAGIN2022: Pelvis and 2 view right hip x-ray  - Mild joint space narrowing of the inferior aspect of the femoroacetabular joint otherwise well-preserved joint space.  No acute bony abnormalities.  There is a slight cam deformity.  Hardware from previous left-sided WILLA is noted without signs of fracture or loosening.      ASSESSMENT/PLAN:  Diagnoses and all orders for this visit:  Greater trochanteric pain syndrome of right lower extremity  -     XR Pelvis w Hip Right G/E 2 Views  -     dexamethasone PF (DECADRON) injection 10 mg  -     lidocaine (PF) (XYLOCAINE) 1 % injection 5 mL  -     DRAIN/INJECT LARGE JOINT/BURSA  Femoroacetabular impingement of right hip  History of total hip arthroplasty, left    52-year-old male with right hip pain.  He does have findings on exam consistent with both symptomatology coming from the area about the greater trochanter as well as the femoroacetabular joint.  The lateral hip seems to be most problematic.  X-rays were performed in the office and personally reviewed by me with the findings as demonstrated above by my interpretation.  No significant bony abnormalities aside from a mild cam deformity to suggest intra-articular pathology.  We discussed focusing on treatment for greater trochanteric pain syndrome.  Treatment options were reviewed which include therapy, home exercises, APAP, NSAIDs, or CSI.  I feel that therapy would be the most beneficial for this patient symptoms.  After discussing these treatment options and reviewing risks/benefits, the patient would like to proceed with an injection to the greater trochanteric bursa on the right.  See procedure note below:    PROCEDURE:  Injection of the Right Greater Trochanteric Bursa     PROCEDURAL PAUSE:    A procedural pause was conducted to verify:  correct patient identity, procedure to be performed, and as applicable, correct side, site, and correct patient position.      INFORMED CONSENT:   I discussed  the risks, possible benefits, and alternatives to injection.  Following denial of allergy and review of potential side effects and complications (including but not necessarily limited to infection, allergic reaction, fat necrosis, skin depigmentation, local tissue breakdown, systemic effects of corticosteroids, elevation of blood glucose, injury to soft tissue and/or nerves, and seizure), all questions were answered, and consent was given to proceed.  Patient verbalized understanding.      PROCEDURE DETAILS:    Side and site were marked, and a time out was performed to verify appropriate patient identifiers.  Following this the right lateral hip was prepped with chlorhexidine.  Utilizing a 3-inch spinal needle the greater trochanteric bursa was injected using 5mL of 1% Lidocaine and 1mL dexamethasone (10mg/mL).  0mL was wasted.     The patient tolerated the procedure without complication and was discharged in good condition after a short observation period.  The patient was instructed to contact me regarding any questions pertaining to the procedure.      DIAGNOSIS:    -Successful injection of the right greater trochanteric bursa without immediate complication      PLAN:   -Post-procedure care reviewed, including avoiding submersion of the injection site for 48 hours   -Return precautions reviewed for signs/symptoms that would be concerning for infection   -The patient was instructed to ice and take APAP this evening if needed  -Offered referral to PT, patient not interested at this time  -Return to clinic as needed      Leon Box MD  8/30/2022  10:36 AM    Total time spent with this patient was 28 minutes which included chart review, visualization and independent interpretation of images, time spent with the patient, and documentation.    Procedure time:  4 minute(s)

## 2023-12-10 ENCOUNTER — TRANSFERRED RECORDS (OUTPATIENT)
Dept: HEALTH INFORMATION MANAGEMENT | Facility: OTHER | Age: 53
End: 2023-12-10

## 2023-12-10 ENCOUNTER — APPOINTMENT (OUTPATIENT)
Dept: GENERAL RADIOLOGY | Facility: OTHER | Age: 53
End: 2023-12-10
Attending: FAMILY MEDICINE
Payer: COMMERCIAL

## 2023-12-10 ENCOUNTER — HOSPITAL ENCOUNTER (EMERGENCY)
Facility: OTHER | Age: 53
Discharge: ANOTHER HOSPITAL | End: 2023-12-10
Admitting: FAMILY MEDICINE
Payer: COMMERCIAL

## 2023-12-10 VITALS
RESPIRATION RATE: 17 BRPM | WEIGHT: 215 LBS | OXYGEN SATURATION: 99 % | TEMPERATURE: 97.2 F | DIASTOLIC BLOOD PRESSURE: 80 MMHG | BODY MASS INDEX: 28.76 KG/M2 | SYSTOLIC BLOOD PRESSURE: 130 MMHG | HEART RATE: 104 BPM

## 2023-12-10 DIAGNOSIS — I21.3 ST ELEVATION MYOCARDIAL INFARCTION (STEMI), UNSPECIFIED ARTERY (H): ICD-10-CM

## 2023-12-10 LAB
ANION GAP SERPL CALCULATED.3IONS-SCNC: 12 MMOL/L (ref 7–15)
ATRIAL RATE - MUSE: 108 BPM
BUN SERPL-MCNC: 12.8 MG/DL (ref 6–20)
CALCIUM SERPL-MCNC: 9.5 MG/DL (ref 8.6–10)
CHLORIDE SERPL-SCNC: 97 MMOL/L (ref 98–107)
CREAT SERPL-MCNC: 0.49 MG/DL (ref 0.67–1.17)
DEPRECATED HCO3 PLAS-SCNC: 22 MMOL/L (ref 22–29)
DIASTOLIC BLOOD PRESSURE - MUSE: NORMAL MMHG
EGFRCR SERPLBLD CKD-EPI 2021: >90 ML/MIN/1.73M2
ERYTHROCYTE [DISTWIDTH] IN BLOOD BY AUTOMATED COUNT: 12 % (ref 10–15)
GLUCOSE SERPL-MCNC: 126 MG/DL (ref 70–99)
HCT VFR BLD AUTO: 45.1 % (ref 40–53)
HGB BLD-MCNC: 16 G/DL (ref 13.3–17.7)
HOLD SPECIMEN: NORMAL
INTERPRETATION ECG - MUSE: NORMAL
MCH RBC QN AUTO: 31.6 PG (ref 26.5–33)
MCHC RBC AUTO-ENTMCNC: 35.5 G/DL (ref 31.5–36.5)
MCV RBC AUTO: 89 FL (ref 78–100)
P AXIS - MUSE: 45 DEGREES
PLATELET # BLD AUTO: 211 10E3/UL (ref 150–450)
POTASSIUM SERPL-SCNC: 4 MMOL/L (ref 3.4–5.3)
PR INTERVAL - MUSE: 142 MS
QRS DURATION - MUSE: 88 MS
QT - MUSE: 314 MS
QTC - MUSE: 420 MS
R AXIS - MUSE: 6 DEGREES
RBC # BLD AUTO: 5.06 10E6/UL (ref 4.4–5.9)
SODIUM SERPL-SCNC: 131 MMOL/L (ref 135–145)
SYSTOLIC BLOOD PRESSURE - MUSE: NORMAL MMHG
T AXIS - MUSE: 64 DEGREES
TROPONIN T SERPL HS-MCNC: 1082 NG/L
VENTRICULAR RATE- MUSE: 108 BPM
WBC # BLD AUTO: 21.5 10E3/UL (ref 4–11)

## 2023-12-10 PROCEDURE — 99285 EMERGENCY DEPT VISIT HI MDM: CPT | Mod: 25 | Performed by: FAMILY MEDICINE

## 2023-12-10 PROCEDURE — 93010 ELECTROCARDIOGRAM REPORT: CPT | Performed by: INTERNAL MEDICINE

## 2023-12-10 PROCEDURE — 84484 ASSAY OF TROPONIN QUANT: CPT | Performed by: FAMILY MEDICINE

## 2023-12-10 PROCEDURE — 93005 ELECTROCARDIOGRAM TRACING: CPT | Performed by: FAMILY MEDICINE

## 2023-12-10 PROCEDURE — 36415 COLL VENOUS BLD VENIPUNCTURE: CPT | Performed by: FAMILY MEDICINE

## 2023-12-10 PROCEDURE — 250N000013 HC RX MED GY IP 250 OP 250 PS 637: Performed by: FAMILY MEDICINE

## 2023-12-10 PROCEDURE — 80048 BASIC METABOLIC PNL TOTAL CA: CPT | Performed by: FAMILY MEDICINE

## 2023-12-10 PROCEDURE — 99285 EMERGENCY DEPT VISIT HI MDM: CPT | Performed by: FAMILY MEDICINE

## 2023-12-10 PROCEDURE — 96374 THER/PROPH/DIAG INJ IV PUSH: CPT | Performed by: FAMILY MEDICINE

## 2023-12-10 PROCEDURE — 71045 X-RAY EXAM CHEST 1 VIEW: CPT | Mod: TC

## 2023-12-10 PROCEDURE — 85014 HEMATOCRIT: CPT | Performed by: FAMILY MEDICINE

## 2023-12-10 RX ORDER — HEPARIN SODIUM 10000 [USP'U]/100ML
0-5000 INJECTION, SOLUTION INTRAVENOUS CONTINUOUS
Status: DISCONTINUED | OUTPATIENT
Start: 2023-12-10 | End: 2023-12-10

## 2023-12-10 RX ADMIN — TICAGRELOR 180 MG: 90 TABLET ORAL at 01:08

## 2023-12-10 ASSESSMENT — ACTIVITIES OF DAILY LIVING (ADL): ADLS_ACUITY_SCORE: 35

## 2023-12-10 ASSESSMENT — ENCOUNTER SYMPTOMS: SHORTNESS OF BREATH: 1

## 2023-12-10 NOTE — PROGRESS NOTES
Spoke with Maryuri from [facility]. Gave her lab results over the phone. She requested we fax her the results and providers note at [phone].

## 2023-12-10 NOTE — ED TRIAGE NOTES
Pt arrives via EMS. Stemi activation in fied. 324 of ASA given. 2 sublingual nitroglycerin. Pt has an 18 guage in each AC. Pt has L  midesternal chest pain.   Triage Assessment (Adult)       Row Name 12/10/23 0112          Triage Assessment    Airway WDL WDL        Skin Circulation/Temperature WDL    Skin Circulation/Temperature WDL WDL        Cardiac WDL    Cardiac WDL X        Peripheral/Neurovascular WDL    Peripheral Neurovascular WDL WDL        Cognitive/Neuro/Behavioral WDL    Cognitive/Neuro/Behavioral WDL WDL

## 2023-12-10 NOTE — ED PROVIDER NOTES
History     Chief Complaint   Patient presents with    Chest Pain     The history is provided by the patient, the EMS personnel and medical records.     Bhaskar Zurita is a 53 year old male here by EMS with chest pain. Symptom onset was 1 1/2 hours PTA. Shortly after his chest pain onset he had SOB.     Per EMS: chest pain 6 of 10, EKG showed diffuse ST elevation. They gave 324 aspirin, 50 mcg fentanyl and 2 doses of SL nitroglycerine.      He has a history of alcohol abuse, HTN (no meds). No heart history, no DM.     Allergies:  Allergies   Allergen Reactions    Penicillins Rash     Red blotches  Red blotches         Problem List:    Patient Active Problem List    Diagnosis Date Noted    Alcoholism (H) 01/03/2022     Priority: Medium    Claustrophobia 01/03/2022     Priority: Medium    History of total left hip replacement 08/14/2021     Priority: Medium    Primary osteoarthritis of left hip 03/05/2021     Priority: Medium    Primary osteoarthritis of right hip 03/05/2021     Priority: Medium    Osteoarthritis of right knee 02/26/2014     Priority: Medium    Nonallopathic lesion of pelvic region 06/04/2012     Priority: Medium    Nonallopathic lesion of thoracic region 06/04/2012     Priority: Medium    Sprain of lumbar region 06/04/2012     Priority: Medium    Backache 05/24/2012     Priority: Medium    Alcohol abuse, episodic 05/16/2012     Priority: Medium    Hypertension 05/16/2012     Priority: Medium    Knee pain 05/16/2012     Priority: Medium        Past Medical History:    No past medical history on file.    Past Surgical History:    No past surgical history on file.    Family History:    No family history on file.    Social History:  Marital Status:  Single [1]  Social History     Tobacco Use    Smoking status: Every Day     Packs/day: .5     Types: Cigarettes    Smokeless tobacco: Current     Types: Chew, Snuff    Tobacco comments:     Quit smoking: Chews Occaionally   Vaping Use    Vaping Use: Never  used   Substance Use Topics    Alcohol use: Yes     Alcohol/week: 14.0 standard drinks of alcohol     Comment: entered treatment 11/2/2021    Drug use: Yes     Types: Marijuana        Medications:    DULoxetine (CYMBALTA) 60 MG capsule  ibuprofen (ADVIL/MOTRIN) 200 MG tablet  meloxicam (MOBIC) 7.5 MG tablet          Review of Systems   Respiratory:  Positive for shortness of breath.    Cardiovascular:  Positive for chest pain.   All other systems reviewed and are negative.      Physical Exam   Weight: 97.5 kg (215 lb)      Physical Exam  Vitals and nursing note reviewed.   Constitutional:       General: He is in acute distress.      Appearance: He is ill-appearing.   Cardiovascular:      Rate and Rhythm: Regular rhythm. Tachycardia present.      Pulses:           Radial pulses are 2+ on the right side and 2+ on the left side.      Heart sounds: Normal heart sounds. No murmur heard.  Pulmonary:      Effort: Pulmonary effort is normal.      Breath sounds: Normal breath sounds.   Skin:     General: Skin is warm and dry.   Neurological:      General: No focal deficit present.      Mental Status: He is alert and oriented to person, place, and time.     No results found for this or any previous visit (from the past 24 hour(s)).    Medications   ticagrelor (BRILINTA) tablet 180 mg (has no administration in time range)   heparin loading dose for LOW INTENSITY TREATMENT * Give BEFORE starting heparin infusion (has no administration in time range)   heparin 25,000 units in 0.45% NaCl 250 mL ANTICOAGULANT infusion (has no administration in time range)       Assessments & Plan (with Medical Decision Making)  Bhaskar Zurita is a 53 year old male here by EMS with chest pain. Symptom onset was 1 1/2 hours PTA. Shortly after his chest pain onset he had SOB.   Per EMS: chest pain 6 of 10, EKG showed diffuse ST elevation. They gave 324 aspirin, 50 mcg fentanyl and 2 doses of SL nitroglycerine.    He has a history of alcohol abuse, HTN  (no meds). No heart history, no DM.  CTA angiogram coronary artery in 2017 shows total calcium score of 257.8, reflecting advanced for age atherosclerotic disease.   Patient Vitals for the past 24 hrs:   BP Temp Temp src Pulse Resp SpO2 Weight   12/10/23 0111 130/80 97.2  F (36.2  C) Tympanic 104 17 99 % --   12/10/23 0104 -- -- -- -- -- -- 97.5 kg (215 lb)     Exam shows normal heart sounds, normal lung sounds.   EKG shows diffuse ST segment elevation, which is a significant change from prior in 2017.  Labs show CBC with WBC 21,500, BMP with Na 131, Cl 97, troponin 1082.  Chest xray looks stable to me. Formal report pending.  1:09 AM  I spoke with Dr Romano at [facility] and they agree this should be called STEMI and they will accept him.   1:19 AM  Out the double doors to the ambulance bay.   Critical care 15 minutes     I have reviewed the nursing notes.    I have reviewed the findings, diagnosis, plan and need for follow up with the patient.  Medical Decision Making  The patient's presentation was of high complexity (an acute health issue posing potential threat to life or bodily function).    The patient's evaluation involved:  an assessment requiring an independent historian (see separate area of note for details)  review of 3+ test result(s) ordered prior to this encounter (see separate area of note for details)  ordering and/or review of 3+ test(s) in this encounter (see separate area of note for details)  discussion of management or test interpretation with another health professional (see separate area of note for details)    The patient's management necessitated high risk (a decision regarding hospitalization) and further care after sign-out to Dr Rebolledo at [facility]  (see their note for further management).      Final diagnoses:   ST elevation myocardial infarction (STEMI), unspecified artery (H)       12/10/2023   [facility], Hector Aldana MD  12/10/23 [id number]

## 2023-12-10 NOTE — PROGRESS NOTES
Pt remained on EMS cot. Left facility at 0120 after medications given. Pt to [facility] for cath lab. Flight not available until 0600.

## 2023-12-11 ENCOUNTER — TRANSFERRED RECORDS (OUTPATIENT)
Dept: HEALTH INFORMATION MANAGEMENT | Facility: OTHER | Age: 53
End: 2023-12-11
Payer: COMMERCIAL

## 2023-12-11 LAB — EJECTION FRACTION: NORMAL %

## 2023-12-14 ENCOUNTER — TELEPHONE (OUTPATIENT)
Dept: CARE COORDINATION | Facility: CLINIC | Age: 53
End: 2023-12-14
Payer: COMMERCIAL

## 2023-12-14 NOTE — TELEPHONE ENCOUNTER
Transitional Care Management Phone Call    Summary of hospitalization:  Discharge summary unavailable from [facility].  DISCHARGE DIAGNOSIS: [id number]   DATE OF DISCHARGE: 12/13/23    Chart reviewed:  Transferred records include only ECHO From [facility].    Follow up needed: Cardiac Rehab, PCP and Cardiology.    Post Discharge Medication Reconciliation: unable to reconcile discharge medications due to no available medication list or discharge summary .    Medications reviewed by: by myself, findings include    Patient reported discharge medications are as follows:    Aspirin 325 mg twice daily  Atorvastatin 80 mg at bedtime  Colchicine  6 mg twice daily  Jardiance 10 mg  each morning  Furosamide 20 mg each morning  Metoprolol 25 mg each morning  Nicotine dermal patch 21 mg once daily  Entresto 26 mg twice daily  Spirolactone 25 mg each morning  Brillinta 90 mg twice daily     Problems taking medications regularly:  None  Problems adhering to non-medication therapy:  None    Other Healthcare Providers Involved in Patient s Care:          [facility] Cardiology  Update since discharge: stable.   Plan of care communicated with patient : Yes    Schedule intake appointment with Cardiac Rehab as directed by [facility] Cardiology.     Schedule hospital discharge follow up visit with PCP.     Patient was warm transferred to clinic scheduling to make the above appointments.  Just a friendly reminder that you appointment is 12/19/23 2:45 PM  Julieta Rivera PA-C Cardiology [facility] .      We encourage you to keep this appointment.  Please remember to bring all of your pills in their bottles (including any vitamins or over the counter pills) with you to your appointment.   The patient indicates understanding of these issues and agrees with the plan of care.   Yes, plans to follow plan of care and keep the scheduled appointment.    Was the patient contacted within the 2 business days or other approved timeframe?   Yes  Was the Medication reconciliation and management done since the patient was discharged? Yes    Arina García RN  12/14/2023 1:02 PM

## 2023-12-15 DIAGNOSIS — I21.3 STEMI (ST ELEVATION MYOCARDIAL INFARCTION) (H): Primary | ICD-10-CM

## 2023-12-19 ENCOUNTER — TRANSFERRED RECORDS (OUTPATIENT)
Dept: HEALTH INFORMATION MANAGEMENT | Facility: OTHER | Age: 53
End: 2023-12-19
Payer: COMMERCIAL

## 2023-12-21 ENCOUNTER — HOSPITAL ENCOUNTER (OUTPATIENT)
Dept: CARDIAC REHAB | Facility: OTHER | Age: 53
Discharge: HOME | End: 2023-12-21
Attending: FAMILY MEDICINE
Payer: COMMERCIAL

## 2023-12-21 ENCOUNTER — OFFICE VISIT (OUTPATIENT)
Dept: INTERNAL MEDICINE | Facility: OTHER | Age: 53
End: 2023-12-21
Payer: COMMERCIAL

## 2023-12-21 VITALS
OXYGEN SATURATION: 100 % | BODY MASS INDEX: 29.63 KG/M2 | DIASTOLIC BLOOD PRESSURE: 82 MMHG | WEIGHT: 223.6 LBS | HEIGHT: 73 IN | HEART RATE: 111 BPM | TEMPERATURE: 97.9 F | SYSTOLIC BLOOD PRESSURE: 130 MMHG | RESPIRATION RATE: 16 BRPM

## 2023-12-21 DIAGNOSIS — Z72.0 TOBACCO USE: ICD-10-CM

## 2023-12-21 DIAGNOSIS — I50.21 ACUTE SYSTOLIC HEART FAILURE (H): ICD-10-CM

## 2023-12-21 DIAGNOSIS — Z95.5 S/P DRUG ELUTING CORONARY STENT PLACEMENT: ICD-10-CM

## 2023-12-21 DIAGNOSIS — I31.9 MYOPERICARDITIS: ICD-10-CM

## 2023-12-21 DIAGNOSIS — I25.2 HISTORY OF ST ELEVATION MYOCARDIAL INFARCTION (STEMI): Primary | ICD-10-CM

## 2023-12-21 LAB
ANION GAP SERPL CALCULATED.3IONS-SCNC: 11 MMOL/L (ref 7–15)
BASOPHILS # BLD AUTO: 0.1 10E3/UL (ref 0–0.2)
BASOPHILS NFR BLD AUTO: 1 %
BUN SERPL-MCNC: 20.4 MG/DL (ref 6–20)
CALCIUM SERPL-MCNC: 9.3 MG/DL (ref 8.6–10)
CHLORIDE SERPL-SCNC: 103 MMOL/L (ref 98–107)
CREAT SERPL-MCNC: 0.75 MG/DL (ref 0.67–1.17)
DEPRECATED HCO3 PLAS-SCNC: 23 MMOL/L (ref 22–29)
EGFRCR SERPLBLD CKD-EPI 2021: >90 ML/MIN/1.73M2
EOSINOPHIL # BLD AUTO: 0.2 10E3/UL (ref 0–0.7)
EOSINOPHIL NFR BLD AUTO: 2 %
ERYTHROCYTE [DISTWIDTH] IN BLOOD BY AUTOMATED COUNT: 11.8 % (ref 10–15)
GLUCOSE SERPL-MCNC: 121 MG/DL (ref 70–99)
HCT VFR BLD AUTO: 36.1 % (ref 40–53)
HGB BLD-MCNC: 12.3 G/DL (ref 13.3–17.7)
IMM GRANULOCYTES # BLD: 0.1 10E3/UL
IMM GRANULOCYTES NFR BLD: 1 %
LYMPHOCYTES # BLD AUTO: 2.9 10E3/UL (ref 0.8–5.3)
LYMPHOCYTES NFR BLD AUTO: 24 %
MCH RBC QN AUTO: 31.2 PG (ref 26.5–33)
MCHC RBC AUTO-ENTMCNC: 34.1 G/DL (ref 31.5–36.5)
MCV RBC AUTO: 92 FL (ref 78–100)
MONOCYTES # BLD AUTO: 0.8 10E3/UL (ref 0–1.3)
MONOCYTES NFR BLD AUTO: 6 %
NEUTROPHILS # BLD AUTO: 7.8 10E3/UL (ref 1.6–8.3)
NEUTROPHILS NFR BLD AUTO: 66 %
NRBC # BLD AUTO: 0 10E3/UL
NRBC BLD AUTO-RTO: 0 /100
PLATELET # BLD AUTO: 452 10E3/UL (ref 150–450)
POTASSIUM SERPL-SCNC: 3.9 MMOL/L (ref 3.4–5.3)
RBC # BLD AUTO: 3.94 10E6/UL (ref 4.4–5.9)
SODIUM SERPL-SCNC: 137 MMOL/L (ref 135–145)
WBC # BLD AUTO: 11.8 10E3/UL (ref 4–11)

## 2023-12-21 PROCEDURE — 80048 BASIC METABOLIC PNL TOTAL CA: CPT | Mod: ZL

## 2023-12-21 PROCEDURE — 93798 PHYS/QHP OP CAR RHAB W/ECG: CPT

## 2023-12-21 PROCEDURE — G0463 HOSPITAL OUTPT CLINIC VISIT: HCPCS

## 2023-12-21 PROCEDURE — 99213 OFFICE O/P EST LOW 20 MIN: CPT

## 2023-12-21 PROCEDURE — 36415 COLL VENOUS BLD VENIPUNCTURE: CPT | Mod: ZL

## 2023-12-21 PROCEDURE — 99496 TRANSJ CARE MGMT HIGH F2F 7D: CPT

## 2023-12-21 PROCEDURE — 85025 COMPLETE CBC W/AUTO DIFF WBC: CPT | Mod: ZL

## 2023-12-21 RX ORDER — ATORVASTATIN CALCIUM 80 MG/1
TABLET, FILM COATED ORAL
COMMUNITY
Start: 2023-12-13

## 2023-12-21 RX ORDER — EMPAGLIFLOZIN 10 MG/1
TABLET, FILM COATED ORAL
COMMUNITY
Start: 2023-12-13

## 2023-12-21 RX ORDER — COLCHICINE 0.6 MG/1
TABLET ORAL
COMMUNITY
Start: 2023-12-13

## 2023-12-21 RX ORDER — CLOPIDOGREL BISULFATE 75 MG/1
TABLET ORAL
COMMUNITY
Start: 2023-12-20

## 2023-12-21 RX ORDER — SACUBITRIL AND VALSARTAN 24; 26 MG/1; MG/1
TABLET, FILM COATED ORAL
COMMUNITY
Start: 2023-12-13

## 2023-12-21 RX ORDER — DIAZEPAM 5 MG
10 TABLET ORAL
COMMUNITY
Start: 2023-11-24 | End: 2023-12-21

## 2023-12-21 RX ORDER — CYCLOBENZAPRINE HCL 10 MG
10 TABLET ORAL DAILY
COMMUNITY
Start: 2023-11-24

## 2023-12-21 RX ORDER — METOPROLOL SUCCINATE 25 MG/1
TABLET, EXTENDED RELEASE ORAL
COMMUNITY
Start: 2023-12-13

## 2023-12-21 RX ORDER — ASPIRIN 325 MG
TABLET ORAL
COMMUNITY
Start: 2023-12-13 | End: 2024-01-12

## 2023-12-21 RX ORDER — SPIRONOLACTONE 25 MG/1
TABLET ORAL
COMMUNITY
Start: 2023-12-13 | End: 2024-01-24

## 2023-12-21 RX ORDER — FUROSEMIDE 20 MG
TABLET ORAL
COMMUNITY
Start: 2023-12-13 | End: 2024-01-19

## 2023-12-21 ASSESSMENT — ENCOUNTER SYMPTOMS
FEVER: 0
SHORTNESS OF BREATH: 1
CHILLS: 0
PALPITATIONS: 0
COUGH: 0
WHEEZING: 0

## 2023-12-21 ASSESSMENT — PAIN SCALES - GENERAL: PAINLEVEL: NO PAIN (0)

## 2023-12-21 NOTE — NURSING NOTE
"Chief Complaint   Patient presents with    Hospital F/U     STEMI     Patient presents to the clinic today for a Hospital follow up for a STEMI  Initial There were no vitals taken for this visit. Estimated body mass index is 28.76 kg/m  as calculated from the following:    Height as of 8/30/22: 1.842 m (6' 0.5\").    Weight as of 12/10/23: 97.5 kg (215 lb).  Meds Reconciled: complete      Ivet Davenport LPN,LPN on 12/21/2023 at 7:50 AM  Ext. [phone]        Ivet Davenport LPN  "

## 2023-12-21 NOTE — PROGRESS NOTES
Assessment & Plan   Bhaskar Zurita is a 53 year old presenting for the following health issues:      ICD-10-CM    1. History of ST elevation myocardial infarction (STEMI) 12/2023  I25.2 Basic Metabolic Panel     CBC and Differential     Basic Metabolic Panel     CBC and Differential      2. S/P drug eluting coronary stent placement 12/2023  Z95.5       3. Myopericarditis  I31.9       4. Acute systolic heart failure (H)  I50.21       5. Tobacco use  Z72.0         Continued to encourage avoiding tobacco use.  Lab work shows white count is trending down, 11.8.  He is currently on aspirin twice daily for myopericarditis-will continue to follow-up with cardiology in regards to weaning his aspirin.  He is continuing on diuretics for acute congestive heart failure secondary to his MI.  He denies any new concerns, he starts cardiac rehab tomorrow.  Instructed him that if he develops any new or worsening symptoms to present to the emergency room.  Patient verbalized understanding. He has close follow up with cardiology scheduled at [facility]-he verbalized he would like to continue his cardiology care there.        No follow-ups on file.    SERENE Everett [facility]     Marleny Muro is a 53 year old, presenting for the following health issues:    Patient presents to clinic for hospital follow-up.  He presented to the emergency room on 12/10/2023 with chest pain he was transferred to [facility] for STEMI and is status post RHETT x 3.  He did have a recent appointment with cardiology 2 days ago.  He reported at that time that he was having some shortness of breath since starting Brilinta but is no longer having any chest pain/pressure or any other new symptoms.  He has since been switched to Plavix and is not noticed any significant change in his symptoms but reports that he has only been on this change for one day.  He has been taking all of his new medications as prescribed.  He reports  that he quit smoking, but reports that he did have chewing tobacco prior to coming into the clinic today.        Hospital F/U (STEMI)    History of Present Illness       Reason for visit:  Stemi  Symptom onset:  3-4 weeks ago  Symptoms include:  Chest pain  Symptom intensity:  Moderate  Symptom progression:  Improving  Had these symptoms before:  No    He eats 0-1 servings of fruits and vegetables daily.He consumes 0 sweetened beverage(s) daily.He exercises with enough effort to increase his heart rate 30 to 60 minutes per day.  He exercises with enough effort to increase his heart rate 3 or less days per week.   He is taking medications regularly.       Hospital Follow-up Visit:    Hospital/Nursing Home/IP Rehab Facility: [facility]  Date of Admission: 12/10/23  Date of Discharge: 12/13/23  Reason(s) for Admission: STEMI    Was your hospitalization related to COVID-19? No   Problems taking medications regularly:  None  Medication changes since discharge: Patient states that he had a change but can't remember wihat it is  Problems adhering to non-medication therapy:  None    Summary of hospitalization:  [facility] discharge summary reviewed  [facility]  Diagnostic Tests/Treatments reviewed.  Follow up needed: Follows up with cardiology in the next 2 to 4 weeks  Other Healthcare Providers Involved in Patient s Care:         Specialist appointment - cardiology/ongoing  Update since discharge: improved.         Plan of care communicated with patient               Review of Systems   Constitutional:  Negative for chills and fever.   Respiratory:  Positive for shortness of breath. Negative for cough and wheezing.    Cardiovascular:  Negative for chest pain, palpitations and peripheral edema.   Genitourinary:  Positive for impotence (For the last month).   All other systems reviewed and are negative.           Objective    /82 (BP Location: Right arm, Patient Position:  "Sitting, Cuff Size: Adult Large)   Pulse 111   Temp 97.9  F (36.6  C) (Temporal)   Resp 16   Ht 1.854 m (6' 1\")   Wt 101.4 kg (223 lb 9.6 oz)   SpO2 100%   BMI 29.50 kg/m    Body mass index is 29.5 kg/m .  Physical Exam  Constitutional:       General: He is not in acute distress.     Appearance: He is well-developed.   HENT:      Head: Normocephalic and atraumatic.      Nose: Nose normal.      Mouth/Throat:      Pharynx: No oropharyngeal exudate.   Eyes:      General: No scleral icterus.     Conjunctiva/sclera: Conjunctivae normal.      Pupils: Pupils are equal, round, and reactive to light.   Neck:      Thyroid: No thyromegaly.   Cardiovascular:      Rate and Rhythm: Normal rate and regular rhythm.      Heart sounds: No murmur heard.  Pulmonary:      Effort: Pulmonary effort is normal. No respiratory distress.      Breath sounds: Normal breath sounds. No wheezing.   Musculoskeletal:         General: No tenderness or deformity. Normal range of motion.      Cervical back: Normal range of motion and neck supple.   Skin:     General: Skin is warm and dry.      Findings: No rash.   Neurological:      Mental Status: He is alert and oriented to person, place, and time.      Cranial Nerves: No cranial nerve deficit.      Coordination: Coordination normal.   Psychiatric:         Behavior: Behavior normal.         Thought Content: Thought content normal.         Judgment: Judgment normal.            Results for orders placed or performed in visit on 12/21/23   Basic Metabolic Panel     Status: Abnormal   Result Value Ref Range    Sodium 137 135 - 145 mmol/L    Potassium 3.9 3.4 - 5.3 mmol/L    Chloride 103 98 - 107 mmol/L    Carbon Dioxide (CO2) 23 22 - 29 mmol/L    Anion Gap 11 7 - 15 mmol/L    Urea Nitrogen 20.4 (H) 6.0 - 20.0 mg/dL    Creatinine 0.75 0.67 - 1.17 mg/dL    GFR Estimate >90 >60 mL/min/1.73m2    Calcium 9.3 8.6 - 10.0 mg/dL    Glucose 121 (H) 70 - 99 mg/dL   CBC with platelets and differential     " Status: Abnormal   Result Value Ref Range    WBC Count 11.8 (H) 4.0 - 11.0 10e3/uL    RBC Count 3.94 (L) 4.40 - 5.90 10e6/uL    Hemoglobin 12.3 (L) 13.3 - 17.7 g/dL    Hematocrit 36.1 (L) 40.0 - 53.0 %    MCV 92 78 - 100 fL    MCH 31.2 26.5 - 33.0 pg    MCHC 34.1 31.5 - 36.5 g/dL    RDW 11.8 10.0 - 15.0 %    Platelet Count 452 (H) 150 - 450 10e3/uL    % Neutrophils 66 %    % Lymphocytes 24 %    % Monocytes 6 %    % Eosinophils 2 %    % Basophils 1 %    % Immature Granulocytes 1 %    NRBCs per 100 WBC 0 <1 /100    Absolute Neutrophils 7.8 1.6 - 8.3 10e3/uL    Absolute Lymphocytes 2.9 0.8 - 5.3 10e3/uL    Absolute Monocytes 0.8 0.0 - 1.3 10e3/uL    Absolute Eosinophils 0.2 0.0 - 0.7 10e3/uL    Absolute Basophils 0.1 0.0 - 0.2 10e3/uL    Absolute Immature Granulocytes 0.1 <=0.4 10e3/uL    Absolute NRBCs 0.0 10e3/uL   CBC and Differential     Status: Abnormal    Narrative    The following orders were created for panel order CBC and Differential.  Procedure                               Abnormality         Status                     ---------                               -----------         ------                     CBC with platelets and d...[[id number]]  Abnormal            Final result                 Please view results for these tests on the individual orders.

## 2023-12-22 ENCOUNTER — HOSPITAL ENCOUNTER (OUTPATIENT)
Dept: CARDIAC REHAB | Facility: OTHER | Age: 53
Discharge: HOME | End: 2023-12-22
Attending: FAMILY MEDICINE
Payer: COMMERCIAL

## 2023-12-22 PROBLEM — Z95.5 S/P DRUG ELUTING CORONARY STENT PLACEMENT: Status: ACTIVE | Noted: 2023-12-22

## 2023-12-22 PROBLEM — Z72.0 TOBACCO USE: Status: ACTIVE | Noted: 2023-12-22

## 2023-12-22 PROCEDURE — 93798 PHYS/QHP OP CAR RHAB W/ECG: CPT

## 2023-12-28 ENCOUNTER — APPOINTMENT (OUTPATIENT)
Dept: CARDIOLOGY | Facility: OTHER | Age: 53
End: 2023-12-28
Attending: STUDENT IN AN ORGANIZED HEALTH CARE EDUCATION/TRAINING PROGRAM
Payer: COMMERCIAL

## 2023-12-28 ENCOUNTER — APPOINTMENT (OUTPATIENT)
Dept: CT IMAGING | Facility: OTHER | Age: 53
End: 2023-12-28
Attending: STUDENT IN AN ORGANIZED HEALTH CARE EDUCATION/TRAINING PROGRAM
Payer: COMMERCIAL

## 2023-12-28 ENCOUNTER — HOSPITAL ENCOUNTER (EMERGENCY)
Facility: OTHER | Age: 53
Discharge: ANOTHER HOSPITAL | End: 2023-12-28
Attending: STUDENT IN AN ORGANIZED HEALTH CARE EDUCATION/TRAINING PROGRAM | Admitting: STUDENT IN AN ORGANIZED HEALTH CARE EDUCATION/TRAINING PROGRAM
Payer: COMMERCIAL

## 2023-12-28 VITALS
OXYGEN SATURATION: 97 % | RESPIRATION RATE: 12 BRPM | HEART RATE: 97 BPM | HEIGHT: 73 IN | TEMPERATURE: 97.8 F | WEIGHT: 213 LBS | BODY MASS INDEX: 28.23 KG/M2 | DIASTOLIC BLOOD PRESSURE: 65 MMHG | SYSTOLIC BLOOD PRESSURE: 95 MMHG

## 2023-12-28 DIAGNOSIS — I31.39 PERICARDIAL EFFUSION: ICD-10-CM

## 2023-12-28 DIAGNOSIS — K92.1 BLACK STOOLS: ICD-10-CM

## 2023-12-28 DIAGNOSIS — D64.9 ANEMIA, UNSPECIFIED TYPE: ICD-10-CM

## 2023-12-28 DIAGNOSIS — I23.6 LEFT VENTRICULAR APICAL THROMBUS FOLLOWING MI (H): ICD-10-CM

## 2023-12-28 DIAGNOSIS — I95.9 HYPOTENSION, UNSPECIFIED HYPOTENSION TYPE: ICD-10-CM

## 2023-12-28 LAB
ANION GAP SERPL CALCULATED.3IONS-SCNC: 12 MMOL/L (ref 7–15)
APTT PPP: 30 SECONDS (ref 22–38)
ATRIAL RATE - MUSE: 115 BPM
BASOPHILS # BLD AUTO: 0.1 10E3/UL (ref 0–0.2)
BASOPHILS NFR BLD AUTO: 1 %
BUN SERPL-MCNC: 23.6 MG/DL (ref 6–20)
CALCIUM SERPL-MCNC: 9.1 MG/DL (ref 8.6–10)
CHLORIDE SERPL-SCNC: 102 MMOL/L (ref 98–107)
CREAT SERPL-MCNC: 0.76 MG/DL (ref 0.67–1.17)
DEPRECATED HCO3 PLAS-SCNC: 24 MMOL/L (ref 22–29)
DIASTOLIC BLOOD PRESSURE - MUSE: NORMAL MMHG
EGFRCR SERPLBLD CKD-EPI 2021: >90 ML/MIN/1.73M2
EOSINOPHIL # BLD AUTO: 0.1 10E3/UL (ref 0–0.7)
EOSINOPHIL NFR BLD AUTO: 1 %
ERYTHROCYTE [DISTWIDTH] IN BLOOD BY AUTOMATED COUNT: 12.3 % (ref 10–15)
GLUCOSE SERPL-MCNC: 101 MG/DL (ref 70–99)
HCT VFR BLD AUTO: 24 % (ref 40–53)
HGB BLD-MCNC: 8.2 G/DL (ref 13.3–17.7)
HOLD SPECIMEN: NORMAL
IMM GRANULOCYTES # BLD: 0.1 10E3/UL
IMM GRANULOCYTES NFR BLD: 0 %
INR PPP: 1.03 (ref 0.85–1.15)
INTERPRETATION ECG - MUSE: NORMAL
LVEF ECHO: NORMAL
LYMPHOCYTES # BLD AUTO: 3.1 10E3/UL (ref 0.8–5.3)
LYMPHOCYTES NFR BLD AUTO: 28 %
MCH RBC QN AUTO: 31.3 PG (ref 26.5–33)
MCHC RBC AUTO-ENTMCNC: 34.2 G/DL (ref 31.5–36.5)
MCV RBC AUTO: 92 FL (ref 78–100)
MONOCYTES # BLD AUTO: 1 10E3/UL (ref 0–1.3)
MONOCYTES NFR BLD AUTO: 9 %
NEUTROPHILS # BLD AUTO: 7 10E3/UL (ref 1.6–8.3)
NEUTROPHILS NFR BLD AUTO: 61 %
NRBC # BLD AUTO: 0 10E3/UL
NRBC BLD AUTO-RTO: 0 /100
P AXIS - MUSE: 57 DEGREES
PLATELET # BLD AUTO: 398 10E3/UL (ref 150–450)
POTASSIUM SERPL-SCNC: 3.7 MMOL/L (ref 3.4–5.3)
PR INTERVAL - MUSE: 162 MS
QRS DURATION - MUSE: 90 MS
QT - MUSE: 338 MS
QTC - MUSE: 467 MS
R AXIS - MUSE: 24 DEGREES
RBC # BLD AUTO: 2.62 10E6/UL (ref 4.4–5.9)
SODIUM SERPL-SCNC: 138 MMOL/L (ref 135–145)
SYSTOLIC BLOOD PRESSURE - MUSE: NORMAL MMHG
T AXIS - MUSE: 136 DEGREES
TROPONIN T SERPL HS-MCNC: 113 NG/L
TROPONIN T SERPL HS-MCNC: 123 NG/L
VENTRICULAR RATE- MUSE: 115 BPM
WBC # BLD AUTO: 11.3 10E3/UL (ref 4–11)

## 2023-12-28 PROCEDURE — 250N000011 HC RX IP 250 OP 636: Performed by: STUDENT IN AN ORGANIZED HEALTH CARE EDUCATION/TRAINING PROGRAM

## 2023-12-28 PROCEDURE — 99291 CRITICAL CARE FIRST HOUR: CPT | Mod: 25 | Performed by: STUDENT IN AN ORGANIZED HEALTH CARE EDUCATION/TRAINING PROGRAM

## 2023-12-28 PROCEDURE — 93308 TTE F-UP OR LMTD: CPT | Mod: 26 | Performed by: STUDENT IN AN ORGANIZED HEALTH CARE EDUCATION/TRAINING PROGRAM

## 2023-12-28 PROCEDURE — 96374 THER/PROPH/DIAG INJ IV PUSH: CPT | Mod: XU | Performed by: STUDENT IN AN ORGANIZED HEALTH CARE EDUCATION/TRAINING PROGRAM

## 2023-12-28 PROCEDURE — 99207 PR NO BILLABLE SERVICE THIS VISIT: CPT | Performed by: INTERNAL MEDICINE

## 2023-12-28 PROCEDURE — 96361 HYDRATE IV INFUSION ADD-ON: CPT | Performed by: STUDENT IN AN ORGANIZED HEALTH CARE EDUCATION/TRAINING PROGRAM

## 2023-12-28 PROCEDURE — 99292 CRITICAL CARE ADDL 30 MIN: CPT | Performed by: STUDENT IN AN ORGANIZED HEALTH CARE EDUCATION/TRAINING PROGRAM

## 2023-12-28 PROCEDURE — 71275 CT ANGIOGRAPHY CHEST: CPT

## 2023-12-28 PROCEDURE — 85610 PROTHROMBIN TIME: CPT | Performed by: STUDENT IN AN ORGANIZED HEALTH CARE EDUCATION/TRAINING PROGRAM

## 2023-12-28 PROCEDURE — 93308 TTE F-UP OR LMTD: CPT | Mod: TC | Performed by: STUDENT IN AN ORGANIZED HEALTH CARE EDUCATION/TRAINING PROGRAM

## 2023-12-28 PROCEDURE — 93005 ELECTROCARDIOGRAM TRACING: CPT | Performed by: STUDENT IN AN ORGANIZED HEALTH CARE EDUCATION/TRAINING PROGRAM

## 2023-12-28 PROCEDURE — 99292 CRITICAL CARE ADDL 30 MIN: CPT | Mod: 25 | Performed by: STUDENT IN AN ORGANIZED HEALTH CARE EDUCATION/TRAINING PROGRAM

## 2023-12-28 PROCEDURE — 258N000003 HC RX IP 258 OP 636: Performed by: STUDENT IN AN ORGANIZED HEALTH CARE EDUCATION/TRAINING PROGRAM

## 2023-12-28 PROCEDURE — 255N000002 HC RX 255 OP 636: Performed by: STUDENT IN AN ORGANIZED HEALTH CARE EDUCATION/TRAINING PROGRAM

## 2023-12-28 PROCEDURE — 96375 TX/PRO/DX INJ NEW DRUG ADDON: CPT | Mod: XU | Performed by: STUDENT IN AN ORGANIZED HEALTH CARE EDUCATION/TRAINING PROGRAM

## 2023-12-28 PROCEDURE — 84484 ASSAY OF TROPONIN QUANT: CPT | Mod: 91 | Performed by: STUDENT IN AN ORGANIZED HEALTH CARE EDUCATION/TRAINING PROGRAM

## 2023-12-28 PROCEDURE — C9113 INJ PANTOPRAZOLE SODIUM, VIA: HCPCS | Performed by: STUDENT IN AN ORGANIZED HEALTH CARE EDUCATION/TRAINING PROGRAM

## 2023-12-28 PROCEDURE — 250N000013 HC RX MED GY IP 250 OP 250 PS 637: Performed by: STUDENT IN AN ORGANIZED HEALTH CARE EDUCATION/TRAINING PROGRAM

## 2023-12-28 PROCEDURE — 93306 TTE W/DOPPLER COMPLETE: CPT | Mod: 26 | Performed by: INTERNAL MEDICINE

## 2023-12-28 PROCEDURE — 96376 TX/PRO/DX INJ SAME DRUG ADON: CPT | Mod: XU | Performed by: STUDENT IN AN ORGANIZED HEALTH CARE EDUCATION/TRAINING PROGRAM

## 2023-12-28 PROCEDURE — 80048 BASIC METABOLIC PNL TOTAL CA: CPT | Performed by: STUDENT IN AN ORGANIZED HEALTH CARE EDUCATION/TRAINING PROGRAM

## 2023-12-28 PROCEDURE — 85025 COMPLETE CBC W/AUTO DIFF WBC: CPT | Performed by: STUDENT IN AN ORGANIZED HEALTH CARE EDUCATION/TRAINING PROGRAM

## 2023-12-28 PROCEDURE — 93010 ELECTROCARDIOGRAM REPORT: CPT | Performed by: INTERNAL MEDICINE

## 2023-12-28 PROCEDURE — 36415 COLL VENOUS BLD VENIPUNCTURE: CPT | Performed by: STUDENT IN AN ORGANIZED HEALTH CARE EDUCATION/TRAINING PROGRAM

## 2023-12-28 PROCEDURE — 85730 THROMBOPLASTIN TIME PARTIAL: CPT | Performed by: STUDENT IN AN ORGANIZED HEALTH CARE EDUCATION/TRAINING PROGRAM

## 2023-12-28 PROCEDURE — C8929 TTE W OR WO FOL WCON,DOPPLER: HCPCS

## 2023-12-28 RX ORDER — IOPAMIDOL 755 MG/ML
88 INJECTION, SOLUTION INTRAVASCULAR ONCE
Status: COMPLETED | OUTPATIENT
Start: 2023-12-28 | End: 2023-12-28

## 2023-12-28 RX ORDER — NITROGLYCERIN 0.4 MG/1
0.4 TABLET SUBLINGUAL ONCE
Status: DISCONTINUED | OUTPATIENT
Start: 2023-12-28 | End: 2023-12-28 | Stop reason: HOSPADM

## 2023-12-28 RX ORDER — MORPHINE SULFATE 2 MG/ML
2 INJECTION, SOLUTION INTRAMUSCULAR; INTRAVENOUS ONCE
Status: COMPLETED | OUTPATIENT
Start: 2023-12-28 | End: 2023-12-28

## 2023-12-28 RX ORDER — HYDROMORPHONE HCL IN WATER/PF 6 MG/30 ML
0.4 PATIENT CONTROLLED ANALGESIA SYRINGE INTRAVENOUS ONCE
Status: COMPLETED | OUTPATIENT
Start: 2023-12-28 | End: 2023-12-28

## 2023-12-28 RX ORDER — ASPIRIN 81 MG/1
324 TABLET, CHEWABLE ORAL ONCE
Status: COMPLETED | OUTPATIENT
Start: 2023-12-28 | End: 2023-12-28

## 2023-12-28 RX ORDER — ACETAMINOPHEN 325 MG/1
975 TABLET ORAL ONCE
Status: COMPLETED | OUTPATIENT
Start: 2023-12-28 | End: 2023-12-28

## 2023-12-28 RX ORDER — ACETAMINOPHEN 500 MG
1000 TABLET ORAL ONCE
Status: COMPLETED | OUTPATIENT
Start: 2023-12-28 | End: 2023-12-28

## 2023-12-28 RX ADMIN — MORPHINE SULFATE 2 MG: 2 INJECTION, SOLUTION INTRAMUSCULAR; INTRAVENOUS at 16:33

## 2023-12-28 RX ADMIN — PANTOPRAZOLE SODIUM 40 MG: 40 INJECTION, POWDER, FOR SOLUTION INTRAVENOUS at 14:46

## 2023-12-28 RX ADMIN — MORPHINE SULFATE 2 MG: 2 INJECTION, SOLUTION INTRAMUSCULAR; INTRAVENOUS at 09:14

## 2023-12-28 RX ADMIN — PERFLUTREN 5 ML: 6.52 INJECTION, SUSPENSION INTRAVENOUS at 11:39

## 2023-12-28 RX ADMIN — SODIUM CHLORIDE 500 ML: 9 INJECTION, SOLUTION INTRAVENOUS at 12:14

## 2023-12-28 RX ADMIN — IOPAMIDOL 88 ML: 755 INJECTION, SOLUTION INTRAVENOUS at 08:39

## 2023-12-28 RX ADMIN — SODIUM CHLORIDE 500 ML: 9 INJECTION, SOLUTION INTRAVENOUS at 09:12

## 2023-12-28 RX ADMIN — HYDROMORPHONE HYDROCHLORIDE 0.4 MG: 0.2 INJECTION, SOLUTION INTRAMUSCULAR; INTRAVENOUS; SUBCUTANEOUS at 18:11

## 2023-12-28 RX ADMIN — ASPIRIN 81 MG CHEWABLE TABLET 324 MG: 81 TABLET CHEWABLE at 08:21

## 2023-12-28 RX ADMIN — ACETAMINOPHEN 1000 MG: 500 TABLET, FILM COATED ORAL at 18:10

## 2023-12-28 RX ADMIN — ACETAMINOPHEN 975 MG: 325 TABLET, FILM COATED ORAL at 12:09

## 2023-12-28 ASSESSMENT — ACTIVITIES OF DAILY LIVING (ADL)
ADLS_ACUITY_SCORE: 35

## 2023-12-28 NOTE — ED NOTES
Patient with some SOB, oxygen at 2 LPM applied for comfort.  Oxygen level at 100% prior to this.   Quality 226: Preventive Care And Screening: Tobacco Use: Screening And Cessation Intervention: Patient screened for tobacco use and is an ex/non-smoker Quality 402: Tobacco Use And Help With Quitting Among Adolescents: Patient screened for tobacco and never smoked Detail Level: Detailed

## 2023-12-28 NOTE — ED PROVIDER NOTES
History     Chief Complaint   Patient presents with    Chest Pain    Shortness of Breath       Bhaskar Zurita is a 53 year old male presenting for chest pain.  Gradual onset of left upper chest pain with shortness of breath starting several days ago.  Pain is pleuritic.  Lightheadedness present.  History markable for recent STEMI status post PCI/LAD DESx3 at [facility] on 12/10/2023.  He did have some pleuritic pain postop felt to be inflammatory myopericarditis which resolved after colchicine and high-dose aspirin.  Was doing well after this until several days ago.  Denies fever, change in his chronic cough, peripheral swelling.  No radiation of pain.  States he did have some fluid around his heart found during recent STEMI workup.      Allergies   Allergen Reactions    Penicillins Rash     Red blotches  Red blotches         Patient Active Problem List    Diagnosis Date Noted    History of ST elevation myocardial infarction (STEMI) 12/22/2023     Priority: Medium    S/P drug eluting coronary stent placement 12/22/2023     Priority: Medium    Tobacco use 12/22/2023     Priority: Medium    Alcoholism (H) 01/03/2022     Priority: Medium    Claustrophobia 01/03/2022     Priority: Medium    History of total left hip replacement 08/14/2021     Priority: Medium    Primary osteoarthritis of left hip 03/05/2021     Priority: Medium    Primary osteoarthritis of right hip 03/05/2021     Priority: Medium    Osteoarthritis of right knee 02/26/2014     Priority: Medium    Nonallopathic lesion of pelvic region 06/04/2012     Priority: Medium    Nonallopathic lesion of thoracic region 06/04/2012     Priority: Medium    Sprain of lumbar region 06/04/2012     Priority: Medium    Backache 05/24/2012     Priority: Medium    Alcohol abuse, episodic 05/16/2012     Priority: Medium    Hypertension 05/16/2012     Priority: Medium    Knee pain 05/16/2012     Priority: Medium       No past medical history on file.    No past surgical  "history on file.    No family history on file.    Social History     Tobacco Use    Smoking status: Former     Packs/day: .5     Types: Cigarettes    Smokeless tobacco: Current     Types: Chew, Snuff    Tobacco comments:     Quit smoking: Chews Occaionally   Vaping Use    Vaping Use: Never used   Substance Use Topics    Alcohol use: Yes     Alcohol/week: 14.0 standard drinks of alcohol     Comment: entered treatment 11/2/2021    Drug use: Yes     Types: Marijuana       Medications:    aspirin (ASA) 325 MG tablet  atorvastatin (LIPITOR) 80 MG tablet  clopidogrel (PLAVIX) 75 MG tablet  colchicine (COLCRYS) 0.6 MG tablet  cyclobenzaprine (FLEXERIL) 10 MG tablet  DULoxetine (CYMBALTA) 60 MG capsule  ENTRESTO 24-26 MG per tablet  furosemide (LASIX) 20 MG tablet  ibuprofen (ADVIL/MOTRIN) 200 MG tablet  JARDIANCE 10 MG TABS tablet  meloxicam (MOBIC) 7.5 MG tablet  metoprolol succinate ER (TOPROL XL) 25 MG 24 hr tablet  NICOTINE STEP 1 21 MG/24HR 24 hr patch  spironolactone (ALDACTONE) 25 MG tablet        Review of Systems: See HPI for pertinent negatives and positives. All other systems reviewed and found to be negative.    Physical Exam   BP 95/65   Pulse 97   Temp 97.8  F (36.6  C) (Temporal)   Resp 12   Ht 1.854 m (6' 1\")   Wt 96.6 kg (213 lb)   SpO2 97%   BMI 28.10 kg/m       General: awake, mildly uncomfortable   HEENT: atraumatic, no JVD  Respiratory: normal effort, clear to auscultation bilaterally  Cardiovascular: Tachycardic, regular, no murmurs, symmetric radial and dorsalis pedis pulses 2+  Abdomen: soft, nontender, nondistended  Extremities: no deformities, edema, tenderness  MSK: no chest wall tenderness  Skin: warm, dry, no rashes  Neuro: alert, no focal deficits    ED Course      No results found for this or any previous visit (from the past 24 hour(s)).      Medications   aspirin (ASA) chewable tablet 324 mg (324 mg Oral $Given 12/28/23 0821)   iopamidol (ISOVUE-370) solution 88 mL (88 mLs " Intravenous $Given 12/28/23 0839)   morphine (PF) injection 2 mg (2 mg Intravenous $Given 12/28/23 0914)   sodium chloride 0.9% BOLUS 500 mL (0 mLs Intravenous Stopped 12/28/23 0942)   perflutren diluted in saline (DEFINITY) injection 10 mL (5 mLs Intravenous $Given 12/28/23 1139)   acetaminophen (TYLENOL) tablet 975 mg (975 mg Oral $Given 12/28/23 1209)   sodium chloride 0.9% BOLUS 500 mL (0 mLs Intravenous Stopped 12/28/23 1317)   pantoprazole (PROTONIX) IV push injection 40 mg (40 mg Intravenous $Given 12/28/23 1446)   morphine (PF) injection 2 mg (2 mg Intravenous $Given 12/28/23 1633)   acetaminophen (TYLENOL) tablet 1,000 mg (1,000 mg Oral $Given 12/28/23 1810)   HYDROmorphone (DILAUDID) injection 0.4 mg (0.4 mg Intravenous $Given 12/28/23 1811)       Assessments & Plan (with Medical Decision Making)     I have reviewed the nursing notes.    ED Course as of 12/29/23 1615   Thu Dec 28, 2023   0818 EKG: sinus tachycardia 115, mild ST elevation in leads II/aVF/V2-V6 with T wave inversion in 1/2/aVL/aVF/V2-6.  V2-6 with possible Wellens type morphology.   0835 Unable to tolerate nitroglycerin due to SBP now in the 90s.   0835 Hemoglobin(!): 8.2  Down 4 points from prior.   0856 Troponin T, High Sensitivity(!!): 123  Down from 2 weeks ago 1082   0952 Dyspnea and chest pain improved status post low-dose morphine.  Pressure is also doing well now 109-125 with normal pulse pressure, these pressures were collected during 500 ml bolus over 1/2 hour bolus.   1017 Troponin T, High Sensitivity(!!): 113  Downtrending   1022 Patient does report 1 week of black-colored stools, which could explain significant hemoglobin drop.   1024 CT Chest Pulmonary Embolism w Contrast  No evidence of pulmonary embolus.  Large pericardial effusion.  Cortical buckling suggesting a healing nondisplaced fracture in the anterior aspect of the left fourth rib (see image #99 of series 5).   Clear lungs. No evidence of pneumothorax.     1036 Called  [facility] who will be calling back regarding potential transfer.   1128 Called [facility] and they are still working to get back to us.   1135 POC US ECHO LIMITED  Formal TTE:  LV ejection fraction is 35-40% (moderately reduced). All apical segments akinetic. Left ventricular thrombus is present. The thrombus is protruding and apical 1.7x1.1 cm.     Global right ventricular function is normal. The right ventricle is normal size.     Large pericardial effusion loculated anteriorly 2.3 cm, posterior 1.9 cm.  Chamber compression is not present; there is no evidence for tamponade.  The inferior vena cava was normal in size with preserved respiratory variability.     Aortic root dilatation is present.  Sinuses of Valsalva 4.3 cm.  No significant valvular abnormalities present.    Consider ischemic cardiomyopathy and recommend cardiology evaluation.   1208 [facility]ology consulted who recommends holding off on anticoagulation currently for thrombus (versus likely GI bleed with black stools and significant hemoglobin drop).  Fact that thrombus protrudes through heart also reassuring that it is less mobile currently and somewhat anchored.   1210 Due to delays from [facility] discussed reaching out to alternative hospitals, but patient prefers to wait for [facility] which seems reasonable at this time as he is stable.   1211 BP(!): 85/65  Rebolusing 500 ml.   1405 On [facility] waitlist after discussion with Dr Kebede due to no step down or ICU availability.   1424 Called [facility] and talk to Dr. Mcknight and cardiac surgery who are unable to accept currently with no beds. They recommend [facility] with cardiac surgery consult.   1455 Spoke with [facility] and they did run case by cardiology. Also called [facility] and waiting to hear back.   1523 [facility] calls asking for update. Instructed to call back at 5 pm if unable to find any placement for patient.   1539 [facility] wait  listed.   1613 Left message at [facility] we will call back regarding bed availability. Return call no beds, but can call back 8 pm to recheck.   1615 [facility] only accepting pediatric traumas.   1620 [facility] will be getting back regarding bed availability.   1628 [facility] does not have bed availability.     1658 [facility] calls and could do must accept to ED if absolutely necessary. Recommends checking with [facility] first. Will follow up with [facility] who is closer and may be able to do must accept too.        53 year old male evaluated for left chest pain with pleuritic nature along with dyspnea at rest over past several days status post STEMI with LAD stenting x3 2 weeks ago with black stools over past week.  Initial vitals remarkable for tachycardia 117.  Normal oxygenation on room air.  EKG with sinus tachycardia and fairly diffuse ST elevation however much improved prior to his recent STEMI (s/p LAD RHETT x 3) EKG 2 weeks ago. Concern for PE/dissection CTA chest study performed (Wells score high risk) demonstrating large pericardial effusion without PE/dissection.  Labs remarkable for anemia 8.2 down significantly from 1 week ago 4 points, elevated troponin 123 with repeat 113 (both significantly improved from 1082 2 weeks ago). Upon inquiring about hemoglobin drop patient reports recent black stools. Patient did have some periodic drops of his blood pressure down to SBP 80s-90s.  He was given a small fluid bolus with improvement of pressures.  Point-of-care ultrasound shows large pericardial effusion and concern for possible right ventricular dysfunction secondary to effusion.  Formal TTE reveals left ventricle apical thrombus seen with contrast without tamponade or RV global dysfunction, but all LV apical segments akinetic. There is large effusion with at least part of it loculated. This may represent hemorrhagic effusion. Without reciprocal ST depression and based on downtrending  troponin suspect this is more consistent with myopericarditis irritation due to effusion/LV thrombus.  Pain is also pleuritic and suspect due to large at least partially loculated effusion.  Remains hemodynamically stable however with soft pressures SBP 90s-110s. Several small fluid boluses have seemed to be helpful. No pressors have been required. Protonix given. Gentle use of narcotics with soft pressures along with Tylenol used for pleuritic left chest pain thought to be related to his pericardial effusion.  No anticoagulation given due to anemia/gi bleed concern and possible hemorrhagic effusion. Reassuring that LV thrombus appears to be anchored and not mobile. Patient needs transfer for cardiology/cardiac surgery access with concern for worsening large pericardial effusion with unknown trajectory and potential for near-term tamponade. He will also need GI for black stools and anemia. [facility] direct ED-ED transfer (Dr Vazquez [facility]).    I have reviewed the findings, diagnosis, TTE imaging, and plan with patient who is in agreement with plan.    Critical care time:  150 minutes of critical care time was spent with this patient, exclusive of all other separately billable services, reviewing labs/imaging/EKG, managing hypotension, specialty consultation, and coordinating transfer.       Discharge Medication List as of 12/28/2023  6:40 PM          Final diagnoses:   Left ventricular apical thrombus following MI (H)   Pericardial effusion   Black stools   Anemia, unspecified type - 8.2 hemoglobin   Hypotension, unspecified hypotension type       12/28/2023   [facility]       Kenny Liu MD  12/28/23 0447       Kenny Liu MD  12/29/23 1610

## 2023-12-28 NOTE — PROGRESS NOTES
"Transfer Type: [facility]  Transfer Triage Note    Date of call: 12/28/23  Time of call: 3:32 PM    Current Patient Location:  [facility] ED  Current Level of Care: ED    Vitals: Temp: 97.8  F (36.6  C) Temp src: Temporal BP: 98/69 Pulse: 98   Resp: 15 SpO2: 96 % Height: 185.4 cm (6' 1\") Weight: 96.6 kg (213 lb)  On room air  Diagnosis: LV thrombus after recent STEMI, pericardial effusion and melena  Reason for requested transfer: Further diagnostic work up, management, and consultation for specialized care   Isolation Needs: None    Care everywhere has been updated and reviewed: Yes  Necessary images have been sent through PACS: Yes    If patient is transferring for specialty care or specific procedure, the specialist required has participated in the transfer call and agreed with need for transfer and anticipated timeline: ED provider had already called [facility]     Transfer accepted: Yes  Stability of Patient: Patient is at risk for instability, however patient requires urgent transfer and does not meet ICU criteria   Is the patient appropriate for [facility]? Yes  Level of Care Needed: IMC  Telemetry Needed:  Cardiac Telemetry  Expected Time of Arrival for Transfer: greater than 24 hours  Arrival Location:  [facility]     Recommendations for Management and Stabilization: Not needed    Additional Comments:   Mr. Bhaskar Zurita is a 53 y.o. man with h/o alcohol use disorder, tobacco use disorder, CAD with recent STEMI on 12/10 who had multiple stents placed 12/10  who presented to [facility] ED after about 1 day of chest pain and pleuritic shortness of breath. On arrival he was found to be tachycardic and EKG had evolving changes of recent STEMI. He had CTA which showed large pericardial effusion without tamponade. Formal echo showed LV apical thrombus.   Also of note he has new evidence of anemia (drop from 12 to 8 in 1 week). He also " mentions having dark tarry stools during the past week. Cardiology at [facility] and at [facility] were contacted. Cardiology at [facility] recommends a higher level of cardiac care and [facility] recommended not starting heparin drip.   While this patient has primarily cardiac needs, at this time, I accepted the patient for medicine primary + cardiology consultation and GI consultation rather than the patient going to cardiology service. There will be a delay in his transfer due to bed availability.   I did request his case be reviewed by tele ICU given his BP is borderline and while he waits for a bed his status may change.     Emma Millard MD  Internal Medicine/Pediatrics Hospitalist    Addendum: Per triage and ICU triage given notice that patient will be IMC status.    Russell Oneill MD  Internal Medicine Hospitalist  [facility]

## 2023-12-28 NOTE — ED TRIAGE NOTES
"Patient present by private car with chest pain and SOB since yesterday.  Patient had an MI with three stent placed two weeks ago at [facility]. Patient alert and orientated.  Rates his pain at a 5/10 this time.  Patient states left chest pain when he breaths./82   Pulse 117   Resp 20   Ht 1.854 m (6' 1\")   Wt 96.6 kg (213 lb)   SpO2 97%   BMI 28.10 kg/m         Triage Assessment (Adult)       Row Name 12/28/23 0806          Triage Assessment    Airway WDL WDL        Respiratory WDL    Respiratory WDL X  SOB        Skin Circulation/Temperature WDL    Skin Circulation/Temperature WDL WDL        Cardiac WDL    Cardiac WDL X;chest pain        Chest Pain Assessment    Chest Pain Location anterior chest, left;shoulder, left        Peripheral/Neurovascular WDL    Peripheral Neurovascular WDL WDL        Cognitive/Neuro/Behavioral WDL    Cognitive/Neuro/Behavioral WDL WDL                     "

## 2023-12-29 NOTE — ED NOTES
Per [facility], it will be approximately 1 hour until another ambulance is available for transportation.  Paula Chowdhury RN on 12/28/2023 at 8:45 PM

## 2024-01-01 LAB — INR (EXTERNAL): 1 (ref 0.9–1.1)

## 2024-01-02 LAB — INR (EXTERNAL): 1.1 (ref 0.9–1.1)

## 2024-01-03 LAB — INR (EXTERNAL): 1.2 (ref 0.9–1.1)

## 2024-01-04 LAB — INR (EXTERNAL): 1.4 (ref 0.9–1.1)

## 2024-01-05 LAB — INR (EXTERNAL): 1.5 (ref 0.9–1.1)

## 2024-01-06 LAB — INR (EXTERNAL): 1.9 (ref 0.9–1.1)

## 2024-01-07 LAB — INR (EXTERNAL): 2.1 (ref 0.9–1.1)

## 2024-01-08 ENCOUNTER — TELEPHONE (OUTPATIENT)
Dept: FAMILY MEDICINE | Facility: OTHER | Age: 54
End: 2024-01-08
Payer: COMMERCIAL

## 2024-01-08 DIAGNOSIS — I25.2 HISTORY OF ST ELEVATION MYOCARDIAL INFARCTION (STEMI): Primary | ICD-10-CM

## 2024-01-08 DIAGNOSIS — Z79.01 LONG TERM CURRENT USE OF ANTICOAGULANT THERAPY: ICD-10-CM

## 2024-01-08 NOTE — TELEPHONE ENCOUNTER
Dr. Perez,    Your patient, Bhaskar Zurita, is newly referred to [facility] at hospital discharge by the inpatient team. Anticoagulation clinic needing a new referring provider that will follow patient in ambulatory setting.     Indication(s):  STEMI- Left ventricular thrombus   Goal Range:  2.0-3.0  Duration: Indefinite unless you change it and tell him otherwise     Anticoagulation clinic requires a referral from one of Bhaskar's [facility] ambulatory provider (PCP or specialist) in order to provide anticoagulation management. The referring provider should anticipate seeing the patient on an ongoing basis, will have INRs and warfarin Rx ordered under their name per protocol, and will be point of contact for [facility] questions on patient's anticoagulation care (procedural holds, critical results, etc).     Please review and sign the pended referral order for Bhaskar Zurita if you are willing to oversee anticoagulation care.     Patient given inpatient warfarin education prior to discharge.     Thank you,  Patti Masterson RN  Anticoagulation clinic

## 2024-01-09 ENCOUNTER — ANTICOAGULATION THERAPY VISIT (OUTPATIENT)
Dept: ANTICOAGULATION | Facility: OTHER | Age: 54
End: 2024-01-09
Payer: COMMERCIAL

## 2024-01-09 DIAGNOSIS — I25.2 HISTORY OF ST ELEVATION MYOCARDIAL INFARCTION (STEMI): Primary | ICD-10-CM

## 2024-01-09 DIAGNOSIS — Z79.01 LONG TERM CURRENT USE OF ANTICOAGULANT THERAPY: ICD-10-CM

## 2024-01-09 LAB — INR POINT OF CARE: 2.1 (ref 0.9–1.1)

## 2024-01-09 PROCEDURE — 36416 COLLJ CAPILLARY BLOOD SPEC: CPT | Mod: ZL

## 2024-01-09 NOTE — PROGRESS NOTES
"ANTICOAGULATION  MANAGEMENT: NEW REFERRAL      SUBJECTIVE/OBJECTIVE     Bhaskar Zurita, a 53 year old male  is newly referred to [facility].    Anticoagulation:    Previously on warfarin: No, new to anticoagulation  Warfarin initiation date (approximate): 10 days ago 12/31/23   Indication(s):  left ventricular thrombosis   Goal Range: 2.0-3.0   Anticoagulation Bridge/Overlap: No   Referring provider: from inpatient provider; ambulatory responsible provider from primary or specialty care needed.  Request sent to Dr. mooney to oversee management.    General Dietary/Social Hx:    Typical vitamin K intake: low; consistent     Other dietary considerations: None     Social History:   Social History     Tobacco Use    Smoking status: Former     Packs/day: .5     Types: Cigarettes    Smokeless tobacco: Current     Types: Chew, Snuff    Tobacco comments:     Quit smoking: Chews Occaionally   Vaping Use    Vaping Use: Never used   Substance Use Topics    Alcohol use: Yes     Alcohol/week: 14.0 standard drinks of alcohol     Comment: entered treatment 11/2/2021    Drug use: Yes     Types: Marijuana     Quit smoking-1 month ago  Still smokes marijuana-pretty consistent   In the past 2 weeks, patient estimates taking medications as instructed % of time: 100    Results:        Recent labs: (last 7 days)     01/09/24  1039   INR 2.1*       Wt Readings from Last 2 Encounters:   12/28/23 213 lb (96.6 kg)   12/21/23 223 lb 9.6 oz (101.4 kg)      Estimated body mass index is 28.1 kg/m  as calculated from the following:    Height as of 12/28/23: 6' 1\" (1.854 m).    Weight as of 12/28/23: 213 lb (96.6 kg).  Lab Results   Component Value Date    AST 33 10/24/2021    ALT 25 10/24/2021    ALBUMIN 4.7 10/24/2021     Lab Results   Component Value Date    CR 0.76 12/28/2023     Estimated Creatinine Clearance: 140.9 mL/min (based on SCr of 0.76 mg/dL).    ASSESSMENT     Goal INR 2-3, standard for indication(s) " above  Starting dose appropriate for patient    PLAN     Dosing Instructions: Continue your current warfarin dose with INR in 3 days       Summary  As of 1/9/2024      Full warfarin instructions:  5 mg every Mon; 10 mg all other days   Next INR check:  1/12/2024               Education provided:   Please call back if any changes to your diet, medications or how you've been taking warfarin  Importance of notifying anticoagulation clinic for: changes in medications; a sooner lab recheck maybe needed and upcoming surgeries and procedures 2 weeks in advance  Information on home INR monitoring  Written instructions provided  Contact [phone], [phone], [phone] with any changes, questions or concerns.     Education still needed:   Patient received warfarin education while he was inpatient      In person    Lab visit scheduled    Standing orders placed in [facility]: Point of Care INR (Lab 5000)    Plan made per ACC anticoagulation protocol    Patti Masterson, RN  Anticoagulation Clinic  1/9/2024

## 2024-01-10 ENCOUNTER — TELEPHONE (OUTPATIENT)
Dept: FAMILY MEDICINE | Facility: OTHER | Age: 54
End: 2024-01-10
Payer: COMMERCIAL

## 2024-01-10 NOTE — TELEPHONE ENCOUNTER
Patient needs Dr. Perez to send a fax to [facility] that he has not been able to work for about three weeks due to hospitalization.  [phone] phone number. Please call.    María Elena Fong on 1/10/2024 at 9:23 AM

## 2024-01-10 NOTE — TELEPHONE ENCOUNTER
Writer asked by provider to obtain records of recent hospital stays. Called patient to verify which hospital/s he was at recently.  He started at [facility] and was there for 4 days. Then he went to [place] for 10 days.     Writer called [facility] to obtain ALMA and get records transferred over to [facility]. Left message as no one answered.     Patient is needing this appointment as he states he is in need of county assistance as he's been out of work for over a month and has no money. He states that [facility] should have been the ones to fill out documentation but they didn't . He is very upset by this.     RICHARD ROWE RN on 1/10/2024 at 4:30 PM

## 2024-01-11 ENCOUNTER — PATIENT OUTREACH (OUTPATIENT)
Dept: CARE COORDINATION | Facility: CLINIC | Age: 54
End: 2024-01-11

## 2024-01-11 ENCOUNTER — OFFICE VISIT (OUTPATIENT)
Dept: FAMILY MEDICINE | Facility: OTHER | Age: 54
End: 2024-01-11
Attending: NURSE PRACTITIONER
Payer: COMMERCIAL

## 2024-01-11 ENCOUNTER — TELEPHONE (OUTPATIENT)
Dept: FAMILY MEDICINE | Facility: OTHER | Age: 54
End: 2024-01-11

## 2024-01-11 VITALS
RESPIRATION RATE: 20 BRPM | HEART RATE: 96 BPM | SYSTOLIC BLOOD PRESSURE: 118 MMHG | WEIGHT: 213.8 LBS | BODY MASS INDEX: 28.34 KG/M2 | TEMPERATURE: 99.3 F | DIASTOLIC BLOOD PRESSURE: 78 MMHG | HEIGHT: 73 IN | OXYGEN SATURATION: 97 %

## 2024-01-11 DIAGNOSIS — I25.2 HISTORY OF ST ELEVATION MYOCARDIAL INFARCTION (STEMI): Primary | ICD-10-CM

## 2024-01-11 DIAGNOSIS — Z95.5 STATUS POST INSERTION OF DRUG ELUTING CORONARY ARTERY STENT: ICD-10-CM

## 2024-01-11 DIAGNOSIS — I25.110 CORONARY ARTERY DISEASE INVOLVING NATIVE CORONARY ARTERY OF NATIVE HEART WITH UNSTABLE ANGINA PECTORIS (H): ICD-10-CM

## 2024-01-11 DIAGNOSIS — I31.39 PERICARDIAL EFFUSION: ICD-10-CM

## 2024-01-11 DIAGNOSIS — Z79.01 LONG TERM CURRENT USE OF ANTICOAGULANT THERAPY: ICD-10-CM

## 2024-01-11 DIAGNOSIS — K92.1 GASTROINTESTINAL HEMORRHAGE WITH MELENA: ICD-10-CM

## 2024-01-11 DIAGNOSIS — Z95.5 S/P DRUG ELUTING CORONARY STENT PLACEMENT: ICD-10-CM

## 2024-01-11 DIAGNOSIS — I10 HYPERTENSION, UNSPECIFIED TYPE: ICD-10-CM

## 2024-01-11 LAB
ALBUMIN SERPL BCG-MCNC: 4.7 G/DL (ref 3.5–5.2)
ALP SERPL-CCNC: 96 U/L (ref 40–150)
ALT SERPL W P-5'-P-CCNC: 29 U/L (ref 0–70)
ANION GAP SERPL CALCULATED.3IONS-SCNC: 11 MMOL/L (ref 7–15)
AST SERPL W P-5'-P-CCNC: 19 U/L (ref 0–45)
BILIRUB SERPL-MCNC: 0.2 MG/DL
BUN SERPL-MCNC: 20.5 MG/DL (ref 6–20)
CALCIUM SERPL-MCNC: 9.8 MG/DL (ref 8.6–10)
CHLORIDE SERPL-SCNC: 100 MMOL/L (ref 98–107)
CREAT SERPL-MCNC: 0.8 MG/DL (ref 0.67–1.17)
DEPRECATED HCO3 PLAS-SCNC: 24 MMOL/L (ref 22–29)
EGFRCR SERPLBLD CKD-EPI 2021: >90 ML/MIN/1.73M2
GLUCOSE SERPL-MCNC: 98 MG/DL (ref 70–99)
HGB BLD-MCNC: 12.9 G/DL (ref 13.3–17.7)
POTASSIUM SERPL-SCNC: 4.2 MMOL/L (ref 3.4–5.3)
PROT SERPL-MCNC: 7.6 G/DL (ref 6.4–8.3)
SODIUM SERPL-SCNC: 135 MMOL/L (ref 135–145)

## 2024-01-11 PROCEDURE — 85018 HEMOGLOBIN: CPT | Mod: ZL | Performed by: NURSE PRACTITIONER

## 2024-01-11 PROCEDURE — G0463 HOSPITAL OUTPT CLINIC VISIT: HCPCS | Performed by: NURSE PRACTITIONER

## 2024-01-11 PROCEDURE — 36415 COLL VENOUS BLD VENIPUNCTURE: CPT | Mod: ZL | Performed by: NURSE PRACTITIONER

## 2024-01-11 PROCEDURE — 80053 COMPREHEN METABOLIC PANEL: CPT | Mod: ZL | Performed by: NURSE PRACTITIONER

## 2024-01-11 PROCEDURE — 99214 OFFICE O/P EST MOD 30 MIN: CPT | Performed by: NURSE PRACTITIONER

## 2024-01-11 RX ORDER — NITROGLYCERIN 0.4 MG/1
0.4 TABLET SUBLINGUAL
COMMUNITY
Start: 2024-01-07

## 2024-01-11 RX ORDER — WARFARIN SODIUM 5 MG/1
TABLET ORAL
COMMUNITY
Start: 2024-01-07 | End: 2024-01-29

## 2024-01-11 RX ORDER — PANTOPRAZOLE SODIUM 40 MG/1
40 TABLET, DELAYED RELEASE ORAL
COMMUNITY
Start: 2024-01-07

## 2024-01-11 ASSESSMENT — PAIN SCALES - GENERAL: PAINLEVEL: NO PAIN (0)

## 2024-01-11 NOTE — PROGRESS NOTES
"Clinic Care Coordination Contact  Care Team Conversations    Call was transferred to me. Patient was asking for a letter stating he was unable to go back to work after having been hospitalized twice recently related to heart attack. He has under $40 to his name, and feels he needs at least a few weeks before feeling able to \"ease into working.\"  He worked at [employer] and found it stressful.    Plan:  Patient completed hospital follow-up with Denise Griggs NP and is going to cardiac follow up in [place]. Letter is being faxed to [facility] so that patient can ask for financial assistance. He declined any further resources at this time. A gas card was given to help with cost of transport to medical appointments.   Gloria Reich RN on 1/11/2024 at 1:15 PM    "

## 2024-01-11 NOTE — TELEPHONE ENCOUNTER
Patient has an appointment with Denise Griggs CNP tomorrow (1/11/24)    Kelsie Guerra CMA on 1/10/2024 at 6:33 PM

## 2024-01-11 NOTE — TELEPHONE ENCOUNTER
Patient did end up coming into the clinic. Called [facility] back and canceled welfare check.     RICHARD ROWE RN on 1/11/2024 at 1:47 PM

## 2024-01-11 NOTE — TELEPHONE ENCOUNTER
Records were faxed over and given to provider for review.     RICHARD ROWE RN on 1/11/2024 at 10:12 AM

## 2024-01-11 NOTE — PROGRESS NOTES
"  Assessment & Plan   Problem List Items Addressed This Visit          Circulatory    Hypertension    Relevant Orders    Hemoglobin (Completed)    Comprehensive Metabolic Panel (Completed)       Other    History of ST elevation myocardial infarction (STEMI) - Primary    Relevant Orders    Hemoglobin (Completed)    Comprehensive Metabolic Panel (Completed)    S/P drug eluting coronary stent placement    Long term current use of anticoagulant therapy     Other Visit Diagnoses       Pericardial effusion        Gastrointestinal hemorrhage with melena        Coronary artery disease involving native coronary artery of native heart with unstable angina pectoris (H)        Status post insertion of drug eluting coronary artery stent            Symptoms the above diagnoses appear to be stable.  Hemoglobin has improved since discharge, CMP is stable.  Plan to continue with current medication management.  Cardiac rehab will be canceled per his request.  He does have follow-up with cardiology and echocardiogram later this month, he is aware of date, time and location.  Congratulated him on ongoing abstinence from alcohol and tobacco.   met with patient today to discuss financial concerns.  Paperwork completed for [facility] and faxed for him.  I suspect he will remain out of work until his cardiac follow-up.    Review of external notes as documented elsewhere in note  Review of the result(s) of each unique test - CMP, HGB  Ordering of each unique test       MED REC REQUIRED  Post Medication Reconciliation Status: discharge medications reconciled, continue medications without change  BMI:   Estimated body mass index is 28.21 kg/m  as calculated from the following:    Height as of this encounter: 1.854 m (6' 1\").    Weight as of this encounter: 97 kg (213 lb 12.8 oz).         No follow-ups on file.    SERENE Baltazar CNP  [facility]   Bhaskar is a 53 year old, presenting for the " following health issues:  Hospital F/U      History of Present Illness       Reason for visit:  Hospital Follow Up          Hospital Follow-up Visit:    Hospital/Nursing Home/IP Rehab Facility:  [facility]  Date of Admission: 12/28/23  Date of Discharge: 01/07/24  Reason(s) for Admission: Pericardial effusion    Was your hospitalization related to COVID-19? No   Problems taking medications regularly:  None  Medication changes since discharge: None  Problems adhering to non-medication therapy:  None    Summary of hospitalization:  CareEverywhere information obtained and reviewed  Diagnostic Tests/Treatments reviewed.  Follow up needed: CMP, hemoglobin  Other Healthcare Providers Involved in Patient s Care:         Specialist appointment - cardiology  Update since discharge: improved.         Plan of care communicated with patient         He presents to clinic today for hospital follow-up.  Earlier in December he was at [facility] due to heart attack, was home for a few days and had recurrent symptoms, seen in the emergency room and subsequently sent to [facility] for pericardial effusion after recent STEMI with cardiac stenting.  He reports since being home he has been feeling well.  He is working on walking 10 to 15 minutes 3-4 times daily and plans to start some light weightlifting.  He went to cardiac rehab x 1, this increased his hip pain so has requested these to be canceled.  He is established with [facility] time clinic and has an upcoming appointment tomorrow.  He has stopped cigarettes, is using a nicotine patch.  He has stopped alcohol use, has not had any further signs of bleeding.  There is recommended for him to have an EGD once he is able to be of Plavix.  He is continuing to use marijuana.  He is not currently working due to the above health concerns, has paperwork from the [facility] to help with financial assistance.    Review of Systems   See above      Objective    /78   Pulse 96   Temp 99.3  " F (37.4  C)   Resp 20   Ht 1.854 m (6' 1\")   Wt 97 kg (213 lb 12.8 oz)   SpO2 97%   BMI 28.21 kg/m    Body mass index is 28.21 kg/m .  Physical Exam   GENERAL: healthy, alert and no distress  EYES: Eyes grossly normal to inspection,  RESP: lungs clear to auscultation - no rales, rhonchi or wheezes  CV: regular rate and rhythm, normal S1 S2, no S3 or S4, no murmur, click or rub, no peripheral edema and peripheral pulses strong  NEURO: Normal strength and tone, mentation intact and speech normal  PSYCH: mentation appears normal, affect normal/bright    Results for orders placed or performed in visit on 01/11/24   Hemoglobin     Status: Abnormal   Result Value Ref Range    Hemoglobin 12.9 (L) 13.3 - 17.7 g/dL   Comprehensive Metabolic Panel     Status: Abnormal   Result Value Ref Range    Sodium 135 135 - 145 mmol/L    Potassium 4.2 3.4 - 5.3 mmol/L    Carbon Dioxide (CO2) 24 22 - 29 mmol/L    Anion Gap 11 7 - 15 mmol/L    Urea Nitrogen 20.5 (H) 6.0 - 20.0 mg/dL    Creatinine 0.80 0.67 - 1.17 mg/dL    GFR Estimate >90 >60 mL/min/1.73m2    Calcium 9.8 8.6 - 10.0 mg/dL    Chloride 100 98 - 107 mmol/L    Glucose 98 70 - 99 mg/dL    Alkaline Phosphatase 96 40 - 150 U/L    AST 19 0 - 45 U/L    ALT 29 0 - 70 U/L    Protein Total 7.6 6.4 - 8.3 g/dL    Albumin 4.7 3.5 - 5.2 g/dL    Bilirubin Total 0.2 <=1.2 mg/dL                       "

## 2024-01-11 NOTE — TELEPHONE ENCOUNTER
Called Amonate CAESAR to do a wellness check per provider.     RICHARD ROWE RN on 1/11/2024 at 10:38 AM

## 2024-01-11 NOTE — TELEPHONE ENCOUNTER
Called patient to make sure he is ok as he didn't show up for his appt with [facility] today. No answer, unable to leave VM as mailbox was full.     RICHARD ROWE RN on 1/11/2024 at 10:31 AM

## 2024-01-12 ENCOUNTER — ANTICOAGULATION THERAPY VISIT (OUTPATIENT)
Dept: ANTICOAGULATION | Facility: OTHER | Age: 54
End: 2024-01-12
Attending: FAMILY MEDICINE
Payer: COMMERCIAL

## 2024-01-12 ENCOUNTER — TELEPHONE (OUTPATIENT)
Dept: FAMILY MEDICINE | Facility: OTHER | Age: 54
End: 2024-01-12

## 2024-01-12 DIAGNOSIS — I25.2 HISTORY OF ST ELEVATION MYOCARDIAL INFARCTION (STEMI): Primary | ICD-10-CM

## 2024-01-12 DIAGNOSIS — Z79.01 LONG TERM CURRENT USE OF ANTICOAGULANT THERAPY: ICD-10-CM

## 2024-01-12 PROBLEM — I31.39 PERICARDIAL EFFUSION: Status: ACTIVE | Noted: 2024-01-12

## 2024-01-12 PROBLEM — I25.110 CORONARY ARTERY DISEASE INVOLVING NATIVE CORONARY ARTERY OF NATIVE HEART WITH UNSTABLE ANGINA PECTORIS (H): Status: ACTIVE | Noted: 2024-01-12

## 2024-01-12 LAB — INR POINT OF CARE: 3.2 (ref 0.9–1.1)

## 2024-01-12 PROCEDURE — 36416 COLLJ CAPILLARY BLOOD SPEC: CPT | Mod: ZL

## 2024-01-12 NOTE — TELEPHONE ENCOUNTER
Returned call to Leena. She just wanted to clarification on when patient could go back to work. As stated in paperwork, he would need to get cleared by cardiology.    Grace Valle LPN on 1/12/2024 at 4:33 PM

## 2024-01-12 NOTE — TELEPHONE ENCOUNTER
She has a question on a medical opinion form that she received 01/11.  Please call.      Magui Sy on 1/12/2024 at 2:03 PM

## 2024-01-12 NOTE — PROGRESS NOTES
ANTICOAGULATION MANAGEMENT     Bhaskar Zurita 53 year old male is on warfarin with supratherapeutic INR result. (Goal INR 2.0-3.0)    Recent labs: (last 7 days)     01/12/24  0817   INR 3.2*       ASSESSMENT     Source(s): In person     Warfarin doses taken: Warfarin taken as instructed  Diet: No new diet changes identified  New illness, injury, or hospitalization: No  Medication/supplement changes: None noted  Signs or symptoms of bleeding or clotting: No  Previous INR: Therapeutic last visit; previously outside of goal range  Additional findings: None     PLAN     Recommended plan for no diet, medication or health factor changes affecting INR     Dosing Instructions: Continue your current warfarin dose with next INR in 3 days       Summary  As of 1/12/2024      Full warfarin instructions:  5 mg every Mon; 10 mg all other days   Next INR check:  1/15/2024               In person    Lab visit scheduled    Education provided: Please call back if any changes to your diet, medications or how you've been taking warfarin, Monitoring for bleeding signs and symptoms, and When to seek medical attention/emergency care    Plan made per [facility] anticoagulation protocol    Yareli Jefferson RN  Anticoagulation Clinic  1/12/2024    _______________________________________________________________________     Anticoagulation Episode Summary       Current INR goal:  2.0-3.0   TTR:  --   Target end date:  Indefinite   Send INR reminders to:  [facility]    Indications    History of ST elevation myocardial infarction (STEMI) [I25.2]  Long term current use of anticoagulant therapy [Z79.01]             Comments:               Anticoagulation Care Providers       Provider Role Specialty Phone number    Lazaro Perez MD Referring Family Medicine [phone]

## 2024-01-15 ENCOUNTER — ANTICOAGULATION THERAPY VISIT (OUTPATIENT)
Dept: ANTICOAGULATION | Facility: OTHER | Age: 54
End: 2024-01-15
Payer: COMMERCIAL

## 2024-01-15 DIAGNOSIS — Z79.01 LONG TERM CURRENT USE OF ANTICOAGULANT THERAPY: ICD-10-CM

## 2024-01-15 DIAGNOSIS — I25.2 HISTORY OF ST ELEVATION MYOCARDIAL INFARCTION (STEMI): Primary | ICD-10-CM

## 2024-01-15 LAB — INR POINT OF CARE: 4.7 (ref 0.9–1.1)

## 2024-01-15 PROCEDURE — 36416 COLLJ CAPILLARY BLOOD SPEC: CPT | Mod: ZL

## 2024-01-15 NOTE — PROGRESS NOTES
Cardiac Rehab Discharge Summary    Reason for discharge:    Patient/family request discontinuation of services.  Pt will be discharged from cardiac rehab.  After a recent hospitalization, pt called scheduling and canceled all his rehab visits.  Pt attended evaluation and one exercise visit.      Progress towards goals:  Goals not met.  Barriers to achieving goals:   limited tolerance for therapy and only attended one visit.    Recommendation(s):    Follow recommendations from cardiology.

## 2024-01-15 NOTE — PROGRESS NOTES
ANTICOAGULATION MANAGEMENT     Bhaskar Zurita 53 year old male is on warfarin with supratherapeutic INR result. (Goal INR 2.0-3.0)    Recent labs: (last 7 days)     01/15/24  0751   INR 4.7*       ASSESSMENT     Source(s): In person     Warfarin doses taken: Warfarin taken as instructed  Diet: No new diet changes identified  New illness, injury, or hospitalization: No  Medication/supplement changes:  had some tylenol over the weekend for pain in his hips  Signs or symptoms of bleeding or clotting: No  Previous INR: Supratherapeutic  Additional findings: New start on day 15 of warfarin     PLAN     Recommended plan for no diet, medication or health factor changes affecting INR     Dosing Instructions: hold dose then decrease your warfarin dose (11.5% change) with next INR in 4 days       Summary  As of 1/15/2024      Full warfarin instructions:  1/15: Hold; Otherwise 10 mg every Mon, Fri; 7.5 mg all other days   Next INR check:  1/19/2024               In person    Lab visit scheduled    Education provided: Please call back if any changes to your diet, medications or how you've been taking warfarin and Monitoring for bleeding signs and symptoms    Plan made per [facility] anticoagulation protocol    Patti Masterson RN  Anticoagulation Clinic  1/15/2024    _______________________________________________________________________     Anticoagulation Episode Summary       Current INR goal:  2.0-3.0   TTR:  --   Target end date:  Indefinite   Send INR reminders to:  [facility]    Indications    History of ST elevation myocardial infarction (STEMI) [I25.2]  Long term current use of anticoagulant therapy [Z79.01]             Comments:               Anticoagulation Care Providers       Provider Role Specialty Phone number    Lazaro Perez MD Referring Family Medicine [phone]

## 2024-01-19 ENCOUNTER — ANTICOAGULATION THERAPY VISIT (OUTPATIENT)
Dept: ANTICOAGULATION | Facility: OTHER | Age: 54
End: 2024-01-19
Payer: COMMERCIAL

## 2024-01-19 DIAGNOSIS — I25.2 HISTORY OF ST ELEVATION MYOCARDIAL INFARCTION (STEMI): Primary | ICD-10-CM

## 2024-01-19 DIAGNOSIS — I10 HYPERTENSION, UNSPECIFIED TYPE: Primary | ICD-10-CM

## 2024-01-19 DIAGNOSIS — Z79.01 LONG TERM CURRENT USE OF ANTICOAGULANT THERAPY: ICD-10-CM

## 2024-01-19 LAB — INR POINT OF CARE: 2.4 (ref 0.9–1.1)

## 2024-01-19 PROCEDURE — 36416 COLLJ CAPILLARY BLOOD SPEC: CPT | Mod: ZL

## 2024-01-19 NOTE — PROGRESS NOTES
ANTICOAGULATION MANAGEMENT     Bhaskar Zurita 53 year old male is on warfarin with therapeutic INR result. (Goal INR 2.0-3.0)    Recent labs: (last 7 days)     01/19/24  0756   INR 2.4*       ASSESSMENT     Source(s): In person     Warfarin doses taken: Warfarin taken as instructed  Diet: Change in alcohol intake may be affecting INR. 1-2 drinks in the alst couple of days   New illness, injury, or hospitalization: No  Medication/supplement changes: None noted  Signs or symptoms of bleeding or clotting: No  Previous INR: Supratherapeutic  Additional findings: None     PLAN     Recommended plan for no diet, medication or health factor changes affecting INR     Dosing Instructions: Continue your current warfarin dose with next INR in 3 days       Summary  As of 1/19/2024      Full warfarin instructions:  10 mg every Mon, Fri; 7.5 mg all other days   Next INR check:  1/22/2024               In person    Lab visit scheduled    Education provided: Please call back if any changes to your diet, medications or how you've been taking warfarin    Plan made per [facility] anticoagulation protocol    Patti Masterson RN  Anticoagulation Clinic  1/19/2024    _______________________________________________________________________     Anticoagulation Episode Summary       Current INR goal:  2.0-3.0   TTR:  78.5% (1 d)   Target end date:  Indefinite   Send INR reminders to:  [facility]    Indications    History of ST elevation myocardial infarction (STEMI) [I25.2]  Long term current use of anticoagulant therapy [Z79.01]             Comments:               Anticoagulation Care Providers       Provider Role Specialty Phone number    Lazaro Perez MD Referring Family Medicine [phone]

## 2024-01-22 ENCOUNTER — ANTICOAGULATION THERAPY VISIT (OUTPATIENT)
Dept: ANTICOAGULATION | Facility: OTHER | Age: 54
End: 2024-01-22
Attending: FAMILY MEDICINE
Payer: COMMERCIAL

## 2024-01-22 DIAGNOSIS — I25.2 HISTORY OF ST ELEVATION MYOCARDIAL INFARCTION (STEMI): Primary | ICD-10-CM

## 2024-01-22 DIAGNOSIS — Z79.01 LONG TERM CURRENT USE OF ANTICOAGULANT THERAPY: ICD-10-CM

## 2024-01-22 LAB — INR POINT OF CARE: 2.7 (ref 0.9–1.1)

## 2024-01-22 PROCEDURE — 36416 COLLJ CAPILLARY BLOOD SPEC: CPT | Mod: ZL

## 2024-01-22 RX ORDER — FUROSEMIDE 20 MG
TABLET ORAL
Qty: 90 TABLET | Refills: 3 | Status: SHIPPED | OUTPATIENT
Start: 2024-01-22

## 2024-01-22 NOTE — PROGRESS NOTES
ANTICOAGULATION MANAGEMENT     Bhaskar Zurita 53 year old male is on warfarin with therapeutic INR result. (Goal INR 2.0-3.0)    Recent labs: (last 7 days)     01/22/24  0752   INR 2.7*       ASSESSMENT     Source(s): In person     Warfarin doses taken: Warfarin taken as instructed  Diet: No new diet changes identified  New illness, injury, or hospitalization: No  Medication/supplement changes: None noted  Signs or symptoms of bleeding or clotting: No  Previous INR: Therapeutic last visit; previously outside of goal range  Additional findings:  new start     PLAN     Recommended plan for no diet, medication or health factor changes affecting INR     Dosing Instructions: Continue your current warfarin dose with next INR in 4 days       Summary  As of 1/22/2024      Full warfarin instructions:  10 mg every Mon, Fri; 7.5 mg all other days   Next INR check:  1/26/2024               In person    Lab visit scheduled    Education provided: Please call back if any changes to your diet, medications or how you've been taking warfarin    Plan made per [facility] anticoagulation protocol    Patti Masterson RN  Anticoagulation Clinic  1/22/2024    _______________________________________________________________________     Anticoagulation Episode Summary       Current INR goal:  2.0-3.0   TTR:  93.4% (4 d)   Target end date:  Indefinite   Send INR reminders to:  [facility]    Indications    History of ST elevation myocardial infarction (STEMI) [I25.2]  Long term current use of anticoagulant therapy [Z79.01]             Comments:               Anticoagulation Care Providers       Provider Role Specialty Phone number    Lazaro Perez MD Referring Family Medicine [phone]

## 2024-01-22 NOTE — TELEPHONE ENCOUNTER
[id number] sent Rx request for the following:      Requested Prescriptions   Pending Prescriptions Disp Refills    furosemide (LASIX) 20 MG tablet         Diuretics (Including Combos) Protocol Failed - 1/22/2024 12:32 PM        Failed - Medication indicated for associated diagnosis     Medication is associated with one or more of the following diagnoses:     Edema   Hypertension   Heart FailureMeniere's Disease          Failed - Has GFR on file in past 12 months and most recent value is normal        Failed - Medication indicated for associated diagnosis     Medication is associated with one or more of the following diagnoses:     Edema   Hypertension   Hypercalcemia   Heart Failure   Chronic Kidney Disease (CKD)     Historical    Last Office Visit:              1/11/24   Future Office visit:           None    Unable to complete prescription refill per RN Medication Refill Policy.     Gloria Garcia RN .............. 1/22/2024  12:33 PM

## 2024-01-24 DIAGNOSIS — I10 HYPERTENSION, UNSPECIFIED TYPE: Primary | ICD-10-CM

## 2024-01-25 ENCOUNTER — ANTICOAGULATION THERAPY VISIT (OUTPATIENT)
Dept: ANTICOAGULATION | Facility: OTHER | Age: 54
End: 2024-01-25
Payer: COMMERCIAL

## 2024-01-25 DIAGNOSIS — I25.2 HISTORY OF ST ELEVATION MYOCARDIAL INFARCTION (STEMI): Primary | ICD-10-CM

## 2024-01-25 DIAGNOSIS — Z79.01 LONG TERM CURRENT USE OF ANTICOAGULANT THERAPY: ICD-10-CM

## 2024-01-25 LAB — INR POINT OF CARE: 2.2 (ref 0.9–1.1)

## 2024-01-25 PROCEDURE — 36416 COLLJ CAPILLARY BLOOD SPEC: CPT | Mod: ZL

## 2024-01-25 RX ORDER — SPIRONOLACTONE 25 MG/1
TABLET ORAL
Qty: 45 TABLET | Refills: 0 | Status: SHIPPED | OUTPATIENT
Start: 2024-01-25

## 2024-01-25 NOTE — TELEPHONE ENCOUNTER
I have ordered sprinolactone, please call patient to ensure he is getting cardiology follow up. Denise Griggs, APRN CNP on 1/25/2024 at 3:57 PM

## 2024-01-25 NOTE — PROGRESS NOTES
ANTICOAGULATION MANAGEMENT     Bhaskar Zurita 53 year old male is on warfarin with therapeutic INR result. (Goal INR 2.0-3.0)    Recent labs: (last 7 days)     01/25/24  1252   INR 2.2*       ASSESSMENT     Source(s): In person     Warfarin doses taken: Warfarin taken as instructed  Diet: No new diet changes identified  New illness, injury, or hospitalization: No  Medication/supplement changes: None noted  Signs or symptoms of bleeding or clotting: No  Previous INR: Therapeutic last 2(+) visits  Additional findings:  new start     PLAN     Recommended plan for no diet, medication or health factor changes affecting INR     Dosing Instructions: Continue your current warfarin dose with next INR in 1 week       Summary  As of 1/25/2024      Full warfarin instructions:  10 mg every Mon, Fri; 7.5 mg all other days   Next INR check:  2/1/2024               In person    Lab visit scheduled    Education provided: Please call back if any changes to your diet, medications or how you've been taking warfarin    Plan made per [facility] anticoagulation protocol    Patti Masterson RN  Anticoagulation Clinic  1/25/2024    _______________________________________________________________________     Anticoagulation Episode Summary       Current INR goal:  2.0-3.0   TTR:  96.2% (1 wk)   Target end date:  Indefinite   Send INR reminders to:  [facility]    Indications    History of ST elevation myocardial infarction (STEMI) [I25.2]  Long term current use of anticoagulant therapy [Z79.01]             Comments:               Anticoagulation Care Providers       Provider Role Specialty Phone number    Lazaro Perez MD Referring Family Medicine [phone]

## 2024-01-25 NOTE — TELEPHONE ENCOUNTER
[id number] sent Rx request for the following:      Requested Prescriptions   Pending Prescriptions Disp Refills    spironolactone (ALDACTONE) 25 MG tablet       Sig: Take one-half tablet (12.5 mg) by mouth daily       Diuretics (Including Combos) Protocol Failed - 1/25/2024  3:05 PM        Failed - Medication indicated for associated diagnosis     Medication is associated with one or more of the following diagnoses:     Edema   Hypertension   Heart Failure   Meniere's Disease        Failed - Has GFR on file in past 12 months and most recent value is normal        Failed - Medication indicated for associated diagnosis     Medication is associated with one or more of the following diagnoses:     Hypertension   Heart Failure   Hyperaldosteronism   Acne   Hirsutism   Hypokalemia   Hepatic Cirrhosis   Nephrotic Syndrome   Myocardial Infarction   Transgender Female   Historically reported- directions not specified    Last Office Visit:              1/11/24   Future Office visit:           None    Unable to complete prescription refill per RN Medication Refill Policy.     Routing to covering provider for refill consideration, as PCP/provider is out of clinic >48 hours or Pt is completely out of medication and provider is out of the clinic today.    Gloria Garcia RN .............. 1/25/2024  3:09 PM

## 2024-01-29 DIAGNOSIS — I25.2 HISTORY OF ST ELEVATION MYOCARDIAL INFARCTION (STEMI): ICD-10-CM

## 2024-01-29 DIAGNOSIS — Z79.01 LONG TERM CURRENT USE OF ANTICOAGULANT THERAPY: Primary | ICD-10-CM

## 2024-01-29 RX ORDER — WARFARIN SODIUM 5 MG/1
TABLET ORAL
Qty: 140 TABLET | Refills: 1 | Status: SHIPPED | OUTPATIENT
Start: 2024-01-29

## 2024-01-29 NOTE — TELEPHONE ENCOUNTER
ANTICOAGULATION MANAGEMENT:  Medication Refill    Anticoagulation Summary  As of 1/25/2024      Warfarin maintenance plan:  10 mg (5 mg x 2) every Mon, Fri; 7.5 mg (5 mg x 1.5) all other days   Next INR check:  2/1/2024   Target end date:  Indefinite    Indications    History of ST elevation myocardial infarction (STEMI) [I25.2]  Long term current use of anticoagulant therapy [Z79.01]                 Anticoagulation Care Providers       Provider Role Specialty Phone number    Lazaro Perez MD Referring Family Medicine [phone]            Visit with referring provider/group within last year: Yes 1/11/24    [facility] referral signed within last year: Yes    Bhaskar meets all criteria for refill (current [facility] referral, office visit with referring provider/group in last 1 year unless directed to return in 2 years in last referring provider visit note, lab monitoring up to date or not exceeding 2 weeks overdue). Rx instructions and quantity supplied updated to match patient's current dosing plan. Warfarin 90 day supply with 1 refill granted per [facility] protocol     Patti Masterson RN  Anticoagulation Clinic

## 2024-02-01 ENCOUNTER — ANTICOAGULATION THERAPY VISIT (OUTPATIENT)
Dept: ANTICOAGULATION | Facility: OTHER | Age: 54
End: 2024-02-01
Attending: FAMILY MEDICINE
Payer: COMMERCIAL

## 2024-02-01 DIAGNOSIS — I25.2 HISTORY OF ST ELEVATION MYOCARDIAL INFARCTION (STEMI): Primary | ICD-10-CM

## 2024-02-01 DIAGNOSIS — Z79.01 LONG TERM CURRENT USE OF ANTICOAGULANT THERAPY: ICD-10-CM

## 2024-02-01 LAB — INR POINT OF CARE: 3.5 (ref 0.9–1.1)

## 2024-02-01 PROCEDURE — 36416 COLLJ CAPILLARY BLOOD SPEC: CPT | Mod: ZL

## 2024-02-01 NOTE — PROGRESS NOTES
ANTICOAGULATION MANAGEMENT     Bhaskar Zurita 53 year old male is on warfarin with supratherapeutic INR result. (Goal INR 2.0-3.0)    Recent labs: (last 7 days)     02/01/24  0837   INR 3.5*       ASSESSMENT     Source(s): In person     Warfarin doses taken: Warfarin taken as instructed  Diet: No new diet changes identified  New illness, injury, or hospitalization: No  Medication/supplement changes: None noted  Signs or symptoms of bleeding or clotting: No  Previous INR: Therapeutic last 2(+) visits  Additional findings:  Sister passed away on 1/29/2024     PLAN     Recommended plan for no diet, medication or health factor changes affecting INR     Dosing Instructions: partial hold then decrease your warfarin dose (8.7% change) with next INR in 1 week       Summary  As of 2/1/2024      Full warfarin instructions:  2/1: 5 mg; Otherwise 7.5 mg every day   Next INR check:  2/8/2024               In person    Lab visit scheduled    Education provided: Please call back if any changes to your diet, medications or how you've been taking warfarin and Monitoring for bleeding signs and symptoms    Plan made per [facility] anticoagulation protocol    Yareli Jefferson RN  Anticoagulation Clinic  2/1/2024    _______________________________________________________________________     Anticoagulation Episode Summary       Current INR goal:  2.0-3.0   TTR:  79.7% (2 wk)   Target end date:  Indefinite   Send INR reminders to:  [facility]    Indications    History of ST elevation myocardial infarction (STEMI) [I25.2]  Long term current use of anticoagulant therapy [Z79.01]             Comments:               Anticoagulation Care Providers       Provider Role Specialty Phone number    Lazaro Perez MD Referring Family Medicine [phone]

## 2024-02-05 ENCOUNTER — TELEPHONE (OUTPATIENT)
Dept: FAMILY MEDICINE | Facility: OTHER | Age: 54
End: 2024-02-05
Payer: COMMERCIAL

## 2024-02-05 NOTE — TELEPHONE ENCOUNTER
[facility] returned a call about a form that was sent to [facility]     Please call and advise    Thank You  Radha Solis on 2/5/2024 at 2:20 PM  \

## 2024-02-08 ENCOUNTER — ANTICOAGULATION THERAPY VISIT (OUTPATIENT)
Dept: ANTICOAGULATION | Facility: OTHER | Age: 54
End: 2024-02-08
Attending: FAMILY MEDICINE
Payer: COMMERCIAL

## 2024-02-08 DIAGNOSIS — I25.2 HISTORY OF ST ELEVATION MYOCARDIAL INFARCTION (STEMI): Primary | ICD-10-CM

## 2024-02-08 DIAGNOSIS — Z79.01 LONG TERM CURRENT USE OF ANTICOAGULANT THERAPY: ICD-10-CM

## 2024-02-08 LAB — INR POINT OF CARE: 2.3 (ref 0.9–1.1)

## 2024-02-08 PROCEDURE — 36416 COLLJ CAPILLARY BLOOD SPEC: CPT | Mod: ZL

## 2024-02-08 NOTE — PROGRESS NOTES
ANTICOAGULATION MANAGEMENT     Bhaskar Zurita 53 year old male is on warfarin with therapeutic INR result. (Goal INR 2.0-3.0)    Recent labs: (last 7 days)     02/08/24  1055   INR 2.3*       ASSESSMENT     Source(s): In person     Warfarin doses taken: Warfarin taken as instructed  Diet: No new diet changes identified  New illness, injury, or hospitalization: No  Medication/supplement changes:  aldactone started on 1/25/24 subsequent INRs may be decreased. Closer INR monitoring recommended.  lasix started on 1/22/24 No interaction anticipated  Signs or symptoms of bleeding or clotting: No  Previous INR: Supratherapeutic  Additional findings: Upcoming surgery/procedure 2/12/24-right heart cath-was given instruction by jessica to continue warfarin-no changes     PLAN     Recommended plan for no diet, medication or health factor changes affecting INR     Dosing Instructions: Continue your current warfarin dose with next INR in 1 week       Summary  As of 2/8/2024      Full warfarin instructions:  7.5 mg every day   Next INR check:  2/15/2024               In person    Patient offered & declined to schedule next visit    Education provided: Please call back if any changes to your diet, medications or how you've been taking warfarin    Plan made per [facility] anticoagulation protocol    Patti Masterson RN  Anticoagulation Clinic  2/8/2024    _______________________________________________________________________     Anticoagulation Episode Summary       Current INR goal:  2.0-3.0   TTR:  72.7% (3 wk)   Target end date:  Indefinite   Send INR reminders to:  [facility]    Indications    History of ST elevation myocardial infarction (STEMI) [I25.2]  Long term current use of anticoagulant therapy [Z79.01]             Comments:               Anticoagulation Care Providers       Provider Role Specialty Phone number    Lazaro Perez MD Referring Family Medicine [phone]

## 2024-02-14 ENCOUNTER — TELEPHONE (OUTPATIENT)
Dept: FAMILY MEDICINE | Facility: OTHER | Age: 54
End: 2024-02-14
Payer: COMMERCIAL

## 2024-02-14 NOTE — TELEPHONE ENCOUNTER
Please call Ning at [facility].  She faxed over a workability / medical form to Denise Griggs.   She wants to know if its been received.          Flaca Yañez on 2/14/2024 at 8:29 AM

## 2024-02-14 NOTE — TELEPHONE ENCOUNTER
Closing encounter. Please see other telephone encounter.    Grace Valle LPN on 2/14/2024 at 9:32 AM

## 2024-02-14 NOTE — TELEPHONE ENCOUNTER
Returned call to Ning at [facility]. Patient was to follow up with Cardiology to determine his workability. She is going to follow up with the patient. If they need anything further, they will make a follow up appointment.    Grace Valle LPN on 2/14/2024 at 9:31 AM

## 2024-02-19 ENCOUNTER — TELEPHONE (OUTPATIENT)
Dept: ANTICOAGULATION | Facility: OTHER | Age: 54
End: 2024-02-19
Payer: COMMERCIAL

## 2024-02-19 ENCOUNTER — TRANSFERRED RECORDS (OUTPATIENT)
Dept: HEALTH INFORMATION MANAGEMENT | Facility: OTHER | Age: 54
End: 2024-02-19
Payer: COMMERCIAL

## 2024-02-19 PROBLEM — I23.6: Status: ACTIVE | Noted: 2023-12-29

## 2024-02-19 PROBLEM — I50.22 CHRONIC SYSTOLIC HEART FAILURE (H): Status: ACTIVE | Noted: 2023-12-29

## 2024-02-19 PROBLEM — I24.1 POSTMYOCARDIAL INFARCTION SYNDROME (H): Status: ACTIVE | Noted: 2023-12-29

## 2024-02-19 PROBLEM — D64.9 ANEMIA: Status: ACTIVE | Noted: 2023-12-29

## 2024-02-19 PROBLEM — K92.1 GASTROINTESTINAL HEMORRHAGE WITH MELENA: Status: ACTIVE | Noted: 2023-12-29

## 2024-02-19 NOTE — TELEPHONE ENCOUNTER
Called and spoke to Patient after verifying last name and date of birth. Patient has a follow up appointment with INR on 2/22/2024 in clinic. Patient just got discharged from hospital today, received Lovenox to take at home.      Yareli Jefferson RN on 2/19/2024 at 4:13 PM

## 2024-02-22 ENCOUNTER — ANTICOAGULATION THERAPY VISIT (OUTPATIENT)
Dept: ANTICOAGULATION | Facility: OTHER | Age: 54
End: 2024-02-22
Attending: FAMILY MEDICINE
Payer: COMMERCIAL

## 2024-02-22 DIAGNOSIS — I25.2 HISTORY OF ST ELEVATION MYOCARDIAL INFARCTION (STEMI): Primary | ICD-10-CM

## 2024-02-22 DIAGNOSIS — Z79.01 LONG TERM CURRENT USE OF ANTICOAGULANT THERAPY: ICD-10-CM

## 2024-02-22 LAB — INR POINT OF CARE: 1.6 (ref 0.9–1.1)

## 2024-02-22 PROCEDURE — 36416 COLLJ CAPILLARY BLOOD SPEC: CPT | Mod: ZL

## 2024-02-22 RX ORDER — ENOXAPARIN SODIUM 100 MG/ML
100 INJECTION SUBCUTANEOUS EVERY 12 HOURS
Qty: 10 ML | Refills: 1 | Status: SHIPPED | OUTPATIENT
Start: 2024-02-22 | End: 2024-02-28

## 2024-02-22 NOTE — PROGRESS NOTES
ANTICOAGULATION MANAGEMENT     Bhaskar Zurita 53 year old male is on warfarin with subtherapeutic INR result. (Goal INR 2.0-3.0)    Recent labs: (last 7 days)     02/22/24  0958   INR 1.6*       ASSESSMENT     Source(s): In person     Warfarin doses taken: Warfarin taken as instructed Was held for 2 days while in hospital. Patient followed hospital dosing on discharge and is bridging with lovenox.   Diet: No new diet changes identified  New illness, injury, or hospitalization: Yes: Recent hospital admission for pericardial effusion  Medication/supplement changes: None noted  Signs or symptoms of bleeding or clotting: No  Previous INR: Therapeutic last visit; previously outside of goal range  Additional findings: Refill needed today. More Lovenox needed      PLAN     Recommended plan for temporary change(s) affecting INR     Dosing Instructions: booster dose then continue your current warfarin dose with next INR in 5 days       Summary  As of 2/22/2024      Full warfarin instructions:  2/22: 12.5 mg; Otherwise 7.5 mg every day   Next INR check:  2/27/2024               In person    Patient offered & declined to schedule next visit    Education provided: Please call back if any changes to your diet, medications or how you've been taking warfarin, Importance of taking warfarin as instructed, Monitoring for bleeding signs and symptoms, and Monitoring for clotting signs and symptoms    Plan made per [facility] anticoagulation protocol    Maira Wynne RN  Anticoagulation Clinic  2/22/2024    _______________________________________________________________________     Anticoagulation Episode Summary       Current INR goal:  2.0-3.0   TTR:  60.9% (1.2 mo)   Target end date:  Indefinite   Send INR reminders to:  [facility]    Indications    History of ST elevation myocardial infarction (STEMI) [I25.2]  Long term current use of anticoagulant therapy [Z79.01]             Comments:               Anticoagulation Care  Providers       Provider Role Specialty Phone number    Lazaro Perez MD Referring Family Medicine [phone]

## 2024-02-28 ENCOUNTER — ANTICOAGULATION THERAPY VISIT (OUTPATIENT)
Dept: ANTICOAGULATION | Facility: OTHER | Age: 54
End: 2024-02-28
Payer: COMMERCIAL

## 2024-02-28 DIAGNOSIS — Z79.01 LONG TERM CURRENT USE OF ANTICOAGULANT THERAPY: ICD-10-CM

## 2024-02-28 DIAGNOSIS — I25.2 HISTORY OF ST ELEVATION MYOCARDIAL INFARCTION (STEMI): Primary | ICD-10-CM

## 2024-02-28 LAB — INR POINT OF CARE: 1.8 (ref 0.9–1.1)

## 2024-02-28 PROCEDURE — 36416 COLLJ CAPILLARY BLOOD SPEC: CPT | Mod: ZL

## 2024-02-28 RX ORDER — ENOXAPARIN SODIUM 100 MG/ML
100 INJECTION SUBCUTANEOUS EVERY 12 HOURS
Qty: 10 ML | Refills: 1 | Status: SHIPPED | OUTPATIENT
Start: 2024-02-28

## 2024-02-28 NOTE — PROGRESS NOTES
ANTICOAGULATION MANAGEMENT     Bhaskar Zurita 53 year old male is on warfarin with subtherapeutic INR result. (Goal INR 2.0-3.0)    Recent labs: (last 7 days)     02/28/24  1007   INR 1.8*       ASSESSMENT     Source(s): In person     Warfarin doses taken: Warfarin taken as instructed  Diet: No new diet changes identified  New illness, injury, or hospitalization: No  Medication/supplement changes: None noted  Signs or symptoms of bleeding or clotting: No  Previous INR: Subtherapeutic  Additional findings: None and Reports he smokes a 4-5 cigarettes on and off, and his sister pasted away.      PLAN     Recommended plan for temporary change(s) affecting INR     Dosing Instructions: Increase your warfarin dose (9.5% change) with next INR in 1 week       Summary  As of 2/28/2024      Full warfarin instructions:  10 mg every Mon, Thu; 7.5 mg all other days   Next INR check:  3/6/2024               In person    Lab visit scheduled    Education provided: Please call back if any changes to your diet, medications or how you've been taking warfarin, Goal range and significance of current result, and Lovenox/Heparin education provided: role of enoxaparin/heparin in bridge therapy and role of enoxaparin/heparin in setting of new blood clot     Plan made per [facility] anticoagulation protocol    Dot Harmon RN  Anticoagulation Clinic  2/28/2024    _______________________________________________________________________     Anticoagulation Episode Summary       Current INR goal:  2.0-3.0   TTR:  52.1% (1.4 mo)   Target end date:  Indefinite   Send INR reminders to:  [facility]    Indications    History of ST elevation myocardial infarction (STEMI) [I25.2]  Long term current use of anticoagulant therapy [Z79.01]             Comments:               Anticoagulation Care Providers       Provider Role Specialty Phone number    Lazaro Perez MD Referring Family Medicine [phone]

## 2024-03-06 ENCOUNTER — ANTICOAGULATION THERAPY VISIT (OUTPATIENT)
Dept: ANTICOAGULATION | Facility: OTHER | Age: 54
End: 2024-03-06
Attending: FAMILY MEDICINE
Payer: COMMERCIAL

## 2024-03-06 DIAGNOSIS — I25.2 HISTORY OF ST ELEVATION MYOCARDIAL INFARCTION (STEMI): Primary | ICD-10-CM

## 2024-03-06 DIAGNOSIS — Z79.01 LONG TERM CURRENT USE OF ANTICOAGULANT THERAPY: ICD-10-CM

## 2024-03-06 LAB — INR POINT OF CARE: 2.6 (ref 0.9–1.1)

## 2024-03-06 PROCEDURE — 36416 COLLJ CAPILLARY BLOOD SPEC: CPT | Mod: ZL

## 2024-03-06 NOTE — PROGRESS NOTES
ANTICOAGULATION MANAGEMENT     Bhaskar Zurita 53 year old male is on warfarin with therapeutic INR result. (Goal INR 2.0-3.0)    Recent labs: (last 7 days)     03/06/24  1111   INR 2.6*       ASSESSMENT     Source(s): In person     Warfarin doses taken: Warfarin taken as instructed  Diet: No new diet changes identified  New illness, injury, or hospitalization: No  Medication/supplement changes: None noted  Signs or symptoms of bleeding or clotting: No  Previous INR: Subtherapeutic  Additional findings:  INR in range. Stopping Lovenox injections.      PLAN     Recommended plan for no diet, medication or health factor changes affecting INR     Dosing Instructions: Continue your current warfarin dose with next INR in 2 weeks       Summary  As of 3/6/2024      Full warfarin instructions:  10 mg every Mon, Thu; 7.5 mg all other days   Next INR check:  3/20/2024               In person    Lab visit scheduled    Education provided: Please call back if any changes to your diet, medications or how you've been taking warfarin    Plan made per [facility]agulation protocol    Maira Wynne RN  Anticoagulation Clinic  3/6/2024    _______________________________________________________________________     Anticoagulation Episode Summary       Current INR goal:  2.0-3.0   TTR:  55.4% (1.6 mo)   Target end date:  Indefinite   Send INR reminders to:  [facility]    Indications    History of ST elevation myocardial infarction (STEMI) [I25.2]  Long term current use of anticoagulant therapy [Z79.01]             Comments:               Anticoagulation Care Providers       Provider Role Specialty Phone number    Lazaro Perez MD Referring Family Medicine [phone]

## 2024-03-20 ENCOUNTER — ANTICOAGULATION THERAPY VISIT (OUTPATIENT)
Dept: ANTICOAGULATION | Facility: OTHER | Age: 54
End: 2024-03-20
Payer: COMMERCIAL

## 2024-03-20 DIAGNOSIS — I25.2 HISTORY OF ST ELEVATION MYOCARDIAL INFARCTION (STEMI): Primary | ICD-10-CM

## 2024-03-20 DIAGNOSIS — Z79.01 LONG TERM CURRENT USE OF ANTICOAGULANT THERAPY: ICD-10-CM

## 2024-03-20 LAB — INR POINT OF CARE: 2 (ref 0.9–1.1)

## 2024-03-20 PROCEDURE — 36416 COLLJ CAPILLARY BLOOD SPEC: CPT | Mod: ZL

## 2024-03-20 NOTE — PROGRESS NOTES
ANTICOAGULATION MANAGEMENT     Bhaskar Zurita 53 year old male is on warfarin with therapeutic INR result. (Goal INR 2.0-3.0)    Recent labs: (last 7 days)     03/20/24  1118   INR 2.0*       ASSESSMENT     Source(s): In person     Warfarin doses taken: Less warfarin taken than planned which may be affecting INR Patient states he took 5mg instead of 7.5mg on 3/19/24 because he had a couple cans of beer.   Diet: No new diet changes identified  New illness, injury, or hospitalization: No  Medication/supplement changes: None noted  Signs or symptoms of bleeding or clotting: No  Previous INR: Therapeutic last visit; previously outside of goal range  Additional findings: None     PLAN     Recommended plan for temporary change(s) affecting INR     Dosing Instructions: Continue your current warfarin dose with next INR in 3 weeks       Summary  As of 3/20/2024      Full warfarin instructions:  10 mg every Mon, Thu; 7.5 mg all other days   Next INR check:  4/10/2024               In person    Lab visit scheduled    Education provided: Please call back if any changes to your diet, medications or how you've been taking warfarin    Plan made per [facility]agulation protocol    Maira Wynne RN  Anticoagulation Clinic  3/20/2024    _______________________________________________________________________     Anticoagulation Episode Summary       Current INR goal:  2.0-3.0   TTR:  65.4% (2.1 mo)   Target end date:  Indefinite   Send INR reminders to:  [facility]    Indications    History of ST elevation myocardial infarction (STEMI) [I25.2]  Long term current use of anticoagulant therapy [Z79.01]             Comments:               Anticoagulation Care Providers       Provider Role Specialty Phone number    Lazaro Perez MD Referring Family Medicine [phone]

## 2024-04-06 ENCOUNTER — HOSPITAL ENCOUNTER (EMERGENCY)
Facility: OTHER | Age: 54
Discharge: HOME | End: 2024-04-06
Attending: STUDENT IN AN ORGANIZED HEALTH CARE EDUCATION/TRAINING PROGRAM | Admitting: STUDENT IN AN ORGANIZED HEALTH CARE EDUCATION/TRAINING PROGRAM
Payer: COMMERCIAL

## 2024-04-06 ENCOUNTER — APPOINTMENT (OUTPATIENT)
Dept: GENERAL RADIOLOGY | Facility: OTHER | Age: 54
End: 2024-04-06
Attending: STUDENT IN AN ORGANIZED HEALTH CARE EDUCATION/TRAINING PROGRAM
Payer: COMMERCIAL

## 2024-04-06 VITALS
WEIGHT: 215 LBS | TEMPERATURE: 97.1 F | BODY MASS INDEX: 28.37 KG/M2 | HEART RATE: 79 BPM | SYSTOLIC BLOOD PRESSURE: 135 MMHG | RESPIRATION RATE: 13 BRPM | DIASTOLIC BLOOD PRESSURE: 100 MMHG | OXYGEN SATURATION: 95 %

## 2024-04-06 DIAGNOSIS — I24.1 POSTMYOCARDIAL INFARCTION SYNDROME (H): ICD-10-CM

## 2024-04-06 DIAGNOSIS — F10.20 ALCOHOLISM (H): ICD-10-CM

## 2024-04-06 DIAGNOSIS — Z95.5 S/P DRUG ELUTING CORONARY STENT PLACEMENT: ICD-10-CM

## 2024-04-06 DIAGNOSIS — Z79.01 LONG TERM CURRENT USE OF ANTICOAGULANT THERAPY: ICD-10-CM

## 2024-04-06 DIAGNOSIS — I50.22 CHRONIC SYSTOLIC HEART FAILURE (H): ICD-10-CM

## 2024-04-06 DIAGNOSIS — I23.6 VENTRICULAR THROMBUS FOLLOWING MI (MYOCARDIAL INFARCTION) (H): ICD-10-CM

## 2024-04-06 DIAGNOSIS — R07.2 SUBSTERNAL CHEST PAIN RELIEVED BY NITROGLYCERIN: Primary | ICD-10-CM

## 2024-04-06 DIAGNOSIS — I25.2 HISTORY OF ST ELEVATION MYOCARDIAL INFARCTION (STEMI): ICD-10-CM

## 2024-04-06 LAB
ALBUMIN SERPL BCG-MCNC: 4.6 G/DL (ref 3.5–5.2)
ALP SERPL-CCNC: 110 U/L (ref 40–150)
ALT SERPL W P-5'-P-CCNC: 25 U/L (ref 0–70)
ANION GAP SERPL CALCULATED.3IONS-SCNC: 9 MMOL/L (ref 7–15)
AST SERPL W P-5'-P-CCNC: 22 U/L (ref 0–45)
BASOPHILS # BLD AUTO: 0.1 10E3/UL (ref 0–0.2)
BASOPHILS NFR BLD AUTO: 1 %
BILIRUB SERPL-MCNC: 0.3 MG/DL
BUN SERPL-MCNC: 16.9 MG/DL (ref 6–20)
CALCIUM SERPL-MCNC: 9.7 MG/DL (ref 8.6–10)
CHLORIDE SERPL-SCNC: 101 MMOL/L (ref 98–107)
CREAT SERPL-MCNC: 0.74 MG/DL (ref 0.67–1.17)
DEPRECATED HCO3 PLAS-SCNC: 28 MMOL/L (ref 22–29)
EGFRCR SERPLBLD CKD-EPI 2021: >90 ML/MIN/1.73M2
EOSINOPHIL # BLD AUTO: 0.2 10E3/UL (ref 0–0.7)
EOSINOPHIL NFR BLD AUTO: 2 %
ERYTHROCYTE [DISTWIDTH] IN BLOOD BY AUTOMATED COUNT: 13.5 % (ref 10–15)
GLUCOSE SERPL-MCNC: 93 MG/DL (ref 70–99)
HCT VFR BLD AUTO: 47.3 % (ref 40–53)
HGB BLD-MCNC: 15.9 G/DL (ref 13.3–17.7)
HOLD SPECIMEN: NORMAL
IMM GRANULOCYTES # BLD: 0.1 10E3/UL
IMM GRANULOCYTES NFR BLD: 1 %
INR PPP: 3.74 (ref 0.85–1.15)
LIPASE SERPL-CCNC: 30 U/L (ref 13–60)
LYMPHOCYTES # BLD AUTO: 3.6 10E3/UL (ref 0.8–5.3)
LYMPHOCYTES NFR BLD AUTO: 29 %
MCH RBC QN AUTO: 29.1 PG (ref 26.5–33)
MCHC RBC AUTO-ENTMCNC: 33.6 G/DL (ref 31.5–36.5)
MCV RBC AUTO: 87 FL (ref 78–100)
MONOCYTES # BLD AUTO: 1.2 10E3/UL (ref 0–1.3)
MONOCYTES NFR BLD AUTO: 9 %
NEUTROPHILS # BLD AUTO: 7.5 10E3/UL (ref 1.6–8.3)
NEUTROPHILS NFR BLD AUTO: 59 %
NRBC # BLD AUTO: 0 10E3/UL
NRBC BLD AUTO-RTO: 0 /100
PLATELET # BLD AUTO: 233 10E3/UL (ref 150–450)
POTASSIUM SERPL-SCNC: 3.8 MMOL/L (ref 3.4–5.3)
PROT SERPL-MCNC: 7.3 G/DL (ref 6.4–8.3)
RBC # BLD AUTO: 5.46 10E6/UL (ref 4.4–5.9)
SODIUM SERPL-SCNC: 138 MMOL/L (ref 135–145)
TROPONIN T SERPL HS-MCNC: 17 NG/L
TROPONIN T SERPL HS-MCNC: 18 NG/L
WBC # BLD AUTO: 12.7 10E3/UL (ref 4–11)

## 2024-04-06 PROCEDURE — 99291 CRITICAL CARE FIRST HOUR: CPT | Mod: 25 | Performed by: STUDENT IN AN ORGANIZED HEALTH CARE EDUCATION/TRAINING PROGRAM

## 2024-04-06 PROCEDURE — 71045 X-RAY EXAM CHEST 1 VIEW: CPT | Mod: TC

## 2024-04-06 PROCEDURE — 83690 ASSAY OF LIPASE: CPT | Performed by: STUDENT IN AN ORGANIZED HEALTH CARE EDUCATION/TRAINING PROGRAM

## 2024-04-06 PROCEDURE — 99284 EMERGENCY DEPT VISIT MOD MDM: CPT | Performed by: STUDENT IN AN ORGANIZED HEALTH CARE EDUCATION/TRAINING PROGRAM

## 2024-04-06 PROCEDURE — 93005 ELECTROCARDIOGRAM TRACING: CPT | Performed by: STUDENT IN AN ORGANIZED HEALTH CARE EDUCATION/TRAINING PROGRAM

## 2024-04-06 PROCEDURE — 80053 COMPREHEN METABOLIC PANEL: CPT | Performed by: STUDENT IN AN ORGANIZED HEALTH CARE EDUCATION/TRAINING PROGRAM

## 2024-04-06 PROCEDURE — 84484 ASSAY OF TROPONIN QUANT: CPT | Performed by: STUDENT IN AN ORGANIZED HEALTH CARE EDUCATION/TRAINING PROGRAM

## 2024-04-06 PROCEDURE — 85610 PROTHROMBIN TIME: CPT | Performed by: STUDENT IN AN ORGANIZED HEALTH CARE EDUCATION/TRAINING PROGRAM

## 2024-04-06 PROCEDURE — 250N000013 HC RX MED GY IP 250 OP 250 PS 637: Performed by: STUDENT IN AN ORGANIZED HEALTH CARE EDUCATION/TRAINING PROGRAM

## 2024-04-06 PROCEDURE — 36415 COLL VENOUS BLD VENIPUNCTURE: CPT | Performed by: STUDENT IN AN ORGANIZED HEALTH CARE EDUCATION/TRAINING PROGRAM

## 2024-04-06 PROCEDURE — 85025 COMPLETE CBC W/AUTO DIFF WBC: CPT | Performed by: STUDENT IN AN ORGANIZED HEALTH CARE EDUCATION/TRAINING PROGRAM

## 2024-04-06 PROCEDURE — 93010 ELECTROCARDIOGRAM REPORT: CPT | Performed by: INTERNAL MEDICINE

## 2024-04-06 RX ORDER — NITROGLYCERIN 0.4 MG/1
0.4 TABLET SUBLINGUAL EVERY 5 MIN PRN
Status: DISCONTINUED | OUTPATIENT
Start: 2024-04-06 | End: 2024-04-06 | Stop reason: HOSPADM

## 2024-04-06 RX ADMIN — NITROGLYCERIN 0.4 MG: 0.4 TABLET SUBLINGUAL at 06:24

## 2024-04-06 RX ADMIN — NITROGLYCERIN 0.4 MG: 0.4 TABLET SUBLINGUAL at 06:15

## 2024-04-06 ASSESSMENT — ACTIVITIES OF DAILY LIVING (ADL)
ADLS_ACUITY_SCORE: 35

## 2024-04-06 ASSESSMENT — COLUMBIA-SUICIDE SEVERITY RATING SCALE - C-SSRS
6. HAVE YOU EVER DONE ANYTHING, STARTED TO DO ANYTHING, OR PREPARED TO DO ANYTHING TO END YOUR LIFE?: NO
1. IN THE PAST MONTH, HAVE YOU WISHED YOU WERE DEAD OR WISHED YOU COULD GO TO SLEEP AND NOT WAKE UP?: NO
2. HAVE YOU ACTUALLY HAD ANY THOUGHTS OF KILLING YOURSELF IN THE PAST MONTH?: NO

## 2024-04-06 NOTE — ED PROVIDER NOTES
ED PROVIDER NOTE  Patient Name: Bhaskar Zurita  MRN: [id number]    CC:    Chief Complaint   Patient presents with    Chest Pain         MDM  53 year old male presenting with chest pain.    In the ED, BP (!) 151/111   Pulse 88   Temp 97.1  F (36.2  C) (Tympanic)   Resp 20   Wt 97.5 kg (215 lb)   SpO2 98%   BMI 28.37 kg/m      Physical exam revealed clear auscultation bilaterally.  Benign abdominal exam.  Grossly neurologically intact.  In no acute distress, no accessory muscle use.  Overall does not look critically ill or toxic .      I have independently reviewed and interpreted the patients test results which I have ordered this visit which are the following:  Labs were remarkable for white blood cell count 12.7.  A1 50.9.  Platelet count 233.  Initial troponin 18. EKG independently reviewed by me revealed EKG reveals some mild ST elevation in the anterior lateral precordial leads, approximately 1 to 2 mm..  EKG appears similar to EKG on 12/20/2023 and morphology.    Patient was given 2 nitros, with improvement of chest pain to 2 out of 10 in nature.    She was signed out to my partner, awaiting second troponin.    Plan  - EKG reveals some mild ST elevation in the anterior lateral precordial leads, approximately 1 to 2 mm.  - nitroglycerin   - CXR pending  -Awaiting second troponin    Clinical impression  Chest pain    Disposition  Signed out to my partner      HPI    Bhaskar Zurita is a 53 year old male with PMH of hypertension, GI hemorrhage, pericardial effusion, CAD status post PCI, ventricular thrombus following MI on warfarin, who presents with chest pain.     History of obtained by: Patient    Approximate 5 hours prior to arrival, patient developed chest pain, midsternal associated shortness of breath.  Symptoms feel similar to when he needed stents in the past.  Symptoms are nonradiating, initially 7 out of 10 in nature, taking 2 nitroglycerin at home with some improvement now 5 out of 10 in nature.   Denies any extremity swelling or calf tenderness, no urinary symptoms no fevers chills coughing runny nose sore throat.    PMH and SH reviewed.    10 point ROS reviewed and negative except as stated in HPI.    Past medical history:  No past medical history on file.    Social history:  Social Connections: Not on file     Social History     Socioeconomic History    Marital status: Single   Tobacco Use    Smoking status: Former     Packs/day: .5     Types: Cigarettes    Smokeless tobacco: Current     Types: Chew, Snuff    Tobacco comments:     Quit smoking: Chews Occaionally   Vaping Use    Vaping Use: Never used   Substance and Sexual Activity    Alcohol use: Yes     Alcohol/week: 14.0 standard drinks of alcohol     Comment: entered treatment 11/2/2021    Drug use: Yes     Types: Marijuana    Sexual activity: Not Currently     Partners: Female   Social History Narrative    Works at [employer]    Preloaded 3/11/2013.     Social Determinants of Health     Financial Resource Strain: Low Risk  (12/21/2023)    Financial Resource Strain     Within the past 12 months, have you or your family members you live with been unable to get utilities (heat, electricity) when it was really needed?: No   Food Insecurity: Low Risk  (12/21/2023)    Food Insecurity     Within the past 12 months, did you worry that your food would run out before you got money to buy more?: No     Within the past 12 months, did the food you bought just not last and you didn t have money to get more?: No   Transportation Needs: Low Risk  (12/21/2023)    Transportation Needs     Within the past 12 months, has lack of transportation kept you from medical appointments, getting your medicines, non-medical meetings or appointments, work, or from getting things that you need?: No   Interpersonal Safety: Low Risk  (1/11/2024)    Interpersonal Safety     Do you feel physically and emotionally safe where you currently live?: Yes     Within the past 12 months, have  you been hit, slapped, kicked or otherwise physically hurt by someone?: No     Within the past 12 months, have you been humiliated or emotionally abused in other ways by your partner or ex-partner?: No   Housing Stability: Low Risk  (12/21/2023)    Housing Stability     Do you have housing? : Yes     Are you worried about losing your housing?: No         I have reviewed the patients prescribed medications:  No current facility-administered medications for this encounter.    Current Outpatient Medications:     atorvastatin (LIPITOR) 80 MG tablet, , Disp: , Rfl:     clopidogrel (PLAVIX) 75 MG tablet, , Disp: , Rfl:     colchicine (COLCRYS) 0.6 MG tablet, , Disp: , Rfl:     cyclobenzaprine (FLEXERIL) 10 MG tablet, Take 10 mg by mouth daily, Disp: , Rfl:     enoxaparin ANTICOAGULANT (LOVENOX) 100 MG/ML syringe, Inject 1 mL (100 mg) Subcutaneous every 12 hours, Disp: 10 mL, Rfl: 1    ENTRESTO 24-26 MG per tablet, , Disp: , Rfl:     furosemide (LASIX) 20 MG tablet, Take 1 tablet (20 mg) by mouth daily, Disp: 90 tablet, Rfl: 3    JARDIANCE 10 MG TABS tablet, , Disp: , Rfl:     metoprolol succinate ER (TOPROL XL) 25 MG 24 hr tablet, , Disp: , Rfl:     NICOTINE STEP 1 21 MG/24HR 24 hr patch, , Disp: , Rfl:     nitroGLYcerin (NITROSTAT) 0.4 MG sublingual tablet, Place 0.4 mg under the tongue, Disp: , Rfl:     pantoprazole (PROTONIX) 40 MG EC tablet, Take 40 mg by mouth, Disp: , Rfl:     spironolactone (ALDACTONE) 25 MG tablet, Take one-half tablet (12.5 mg) by mouth daily, Disp: 45 tablet, Rfl: 0    warfarin ANTICOAGULANT (COUMADIN) 5 MG tablet, 10 mg (2 tablets) every Mon, Fri; 7.5 mg (1.5 tablets) all other days or as directed by the [facility], Disp: 140 tablet, Rfl: 1    Allergies:  Allergies   Allergen Reactions    Penicillins Rash     Red blotches  Red blotches           Vitals:    04/06/24 0545   BP: (!) 151/111   Pulse: 88   Resp: 20   Temp: 97.1  F (36.2  C)   TempSrc: Tympanic   SpO2: 98%   Weight: 97.5 kg (215  lb)       Physical Exam  Vitals: BP (!) 151/111   Pulse 88   Temp 97.1  F (36.2  C) (Tympanic)   Resp 20   Wt 97.5 kg (215 lb)   SpO2 98%   BMI 28.37 kg/m    Constitutional: awake, NAD  Eyes: No conjunctival injection and normal lids, PERRL, EOMI  ENT: external nose and ears atraumatic  Neck: Symmetric, trachea midline, Supple  CV: RRR, no murmurs appreciated.  Pulm: Unlabored respiratory effort, good air movement, CTAB, no w/c/r appreciated.  GI: Soft, nontender, nondistended.  No rebound or guarding  MSK: No deformities.  No cyanosis.  Neuro: AOx4, normal speech, following commands, grossly symmetric strength in all extremities.  Cranial nerves III-XII grossly intact.    Skin: warm & dry, no rashes or lesions  Psych: Appropriate mood & affect    Past medical history, past surgical history, problem list, family history, social history, medication list, and allergies were reviewed as documented in epic snapshot.  Relevant review of systems are documented within the HPI above.    Complexity of the Problems Addressed  5 (High) - 1 or more chronic illnesses with severe exacerbation, progression, or side effects of treatment or 1 acute chronic illness or injury that poses threat to live or bodily function    Complexity of Data  I have reviewed the following pertinent historical labs, diagnoses, tests, and/or imaging which contribute to complexity of this patient's care.    5 - (Extensive) - (2 of three above)    Complication Risk  Based on my interpretation of the current labs, historical tests and/or external tests. Patient does have a risk level as stated below of morbidity, threat to life, and bodily function, and severe exacerbation if not treated.   4 - Moderate ( Rx drug management, significant limitation of treatment options 2/2 social determinants of health, decision regarding elective major surgery without risk, decision regarding minor surgery with identified patient risk).  -Rx drug management such as  giving new Rx in the ED, new Rx prescibed for home use, modification of Rx Drugs, review of home meds and considering dose adjustment but not dose adjustment made.  - major/ minor surgery discussions (regarding fracture reduction, joint relocation, chest tube, cardioversion, intubation)    ED Events, Labs and Imaging:       Corbin Olmos MD  Emergency Medicine    Dictation Disclaimer: Some notes are completed with voice-recognition dictation software. Errors are generally corrected in real time. Please contact me via Epic staff message if you note any errors requiring clarification.     Yang Olmos MD  04/06/24 0614       Yang Olmos MD  04/06/24 0633

## 2024-04-06 NOTE — DISCHARGE INSTRUCTIONS
Cardiac workup and heart enzymes were normal today.    Continue routine follow-up with cardiology clinic.    Return as needed for new or worsening symptoms.

## 2024-04-06 NOTE — ED PROVIDER NOTES
Emergency Department Transfer of Care Note    Assessment / Plan / Medical Decision Making   Assumed care from Dr. Olmos per routine change of shift.  Chart reviewed.    Patient personally evaluated.  EXAM:  Alert, nontoxic, no acute distress.  Clear bilaterally. regular rate and rhythm   Well-healed infra sternal incision and chest tube drain scar.    Transfer of Care Plan:  ED Course as of 04/06/24 0831   Sat Apr 06, 2024   0645 Assumed care from Dr. Olmos, per routine change of shift. 4 hours of substernal chest pain. STEMI on 12/10/2023 - stents placed at [facility] in [place]. + Developed post-procedural pericardial effusion requiring surgery/drain placement.   Hx LV Thrombus - now on Plavix + Warfarin.    + Hx of Tobacco use and Alcoholism.   Dx with Systolic heart failure. Ischemic heart disease.    Took 2 NTG at home, given 2 NTG in ER. Initial Troponin normal at 18.   EKG essentially unchanged from previous.   INR 3.74.   2 hour troponin pending.    0720 Chest pain currently 2/10   0744 EKG from 5:45 AM shows sinus rhythm with rate of 83.  Low voltage QRS, inferior infarction, age undetermined.  Cannot rule out anterior septal infarction, age undetermined.  T wave abnormality laterally, consider ischemia.  Abnormal EKG.  Compared to EKG from 12/28/2023, no significant change.  Had evolving changes following previous anterior/inferior infarction.   0821 Follow-up troponin was normal at 17   0821 Patient is stable and would like to discharge home.  Recommend routine follow-up with cardiology.  Return as needed for new or worsening symptoms.         New Prescriptions    No medications on file       Final diagnoses:   Substernal chest pain relieved by nitroglycerin   History of ST elevation myocardial infarction (STEMI) - 12/10/2023 - [facility]   Chronic systolic heart failure (H)   Ventricular thrombus following MI (myocardial infarction) (H)   Postmyocardial infarction syndrome (H)   Alcoholism (H)   S/P drug  eluting coronary stent placement   Long term current use of anticoagulant therapy       Maximino Vance MD  4/6/2024   [facility]       Maximino Vance MD  04/06/24 0809

## 2024-04-06 NOTE — ED TRIAGE NOTES
Pt arrives via private car with complaints of chest pain that started around 0100. The chest pain is midsternal and radiates to left shoulder, states it is worse when he takes a deep breath. Pt took 2 sublingual nitros at home. Pt had stents placed in December of 2023.     Triage Assessment (Adult)       Row Name 04/06/24 0541          Triage Assessment    Airway WDL WDL        Respiratory WDL    Respiratory WDL X;rhythm/pattern     Rhythm/Pattern, Respiratory shortness of breath        Skin Circulation/Temperature WDL    Skin Circulation/Temperature WDL WDL        Cardiac WDL    Cardiac WDL X;chest pain        Chest Pain Assessment    Chest Pain Location midsternal     Chest Pain Radiation shoulder     Character pressure     Precipitating Factors physical exertion     Chest Pain Intervention cardiac biomarkers drawn;cardiac monitoring continued;cardiac monitor placed;12-lead ECG obtained        Peripheral/Neurovascular WDL    Peripheral Neurovascular WDL WDL        Cognitive/Neuro/Behavioral WDL    Cognitive/Neuro/Behavioral WDL WDL

## 2024-04-09 LAB
ATRIAL RATE - MUSE: 83 BPM
DIASTOLIC BLOOD PRESSURE - MUSE: NORMAL MMHG
INTERPRETATION ECG - MUSE: NORMAL
P AXIS - MUSE: 38 DEGREES
PR INTERVAL - MUSE: 160 MS
QRS DURATION - MUSE: 86 MS
QT - MUSE: 378 MS
QTC - MUSE: 444 MS
R AXIS - MUSE: -17 DEGREES
SYSTOLIC BLOOD PRESSURE - MUSE: NORMAL MMHG
T AXIS - MUSE: 95 DEGREES
VENTRICULAR RATE- MUSE: 83 BPM

## 2024-04-10 ENCOUNTER — ANTICOAGULATION THERAPY VISIT (OUTPATIENT)
Dept: ANTICOAGULATION | Facility: OTHER | Age: 54
End: 2024-04-10
Payer: COMMERCIAL

## 2024-04-10 DIAGNOSIS — I25.2 HISTORY OF ST ELEVATION MYOCARDIAL INFARCTION (STEMI): Primary | ICD-10-CM

## 2024-04-10 DIAGNOSIS — Z79.01 LONG TERM CURRENT USE OF ANTICOAGULANT THERAPY: ICD-10-CM

## 2024-04-10 LAB — INR POINT OF CARE: 4.2 (ref 0.9–1.1)

## 2024-04-10 PROCEDURE — 36416 COLLJ CAPILLARY BLOOD SPEC: CPT | Mod: ZL

## 2024-04-10 NOTE — PROGRESS NOTES
ANTICOAGULATION MANAGEMENT     Bhaskar Zurita 53 year old male is on warfarin with supratherapeutic INR result. (Goal INR 2.0-3.0)    Recent labs: (last 7 days)     04/10/24  1121   INR 4.2*       ASSESSMENT     Source(s): In person     Warfarin doses taken: Warfarin taken as instructed  Diet: Increased greens/vitamin K in diet; plans to resume previous intake and Change in alcohol intake may be affecting INR. Had more beers   New illness, injury, or hospitalization: Yes: Seen in the ED on 04/06/2024 for chest pain.   Medication/supplement changes: None noted  Signs or symptoms of bleeding or clotting: No  Previous INR: Therapeutic last 2(+) visits  Additional findings: None     PLAN     Recommended plan for temporary change(s) affecting INR     Dosing Instructions: hold dose then continue your current warfarin dose with next INR in 1 week       Summary  As of 4/10/2024      Full warfarin instructions:  4/10: Hold; Otherwise 10 mg every Mon, Thu; 7.5 mg all other days   Next INR check:  4/17/2024               In person    Lab visit scheduled    Education provided: Please call back if any changes to your diet, medications or how you've been taking warfarin    Plan made per [facility] anticoagulation protocol    Diana Kim RN  Anticoagulation Clinic  4/10/2024    _______________________________________________________________________     Anticoagulation Episode Summary       Current INR goal:  2.0-3.0   TTR:  60.5% (2.8 mo)   Target end date:  Indefinite   Send INR reminders to:  [facility]    Indications    History of ST elevation myocardial infarction (STEMI) - 12/10/2023 - [facility] [I25.2]  Long term current use of anticoagulant therapy [Z79.01]             Comments:               Anticoagulation Care Providers       Provider Role Specialty Phone number    Lazaro Perez MD Referring Family Medicine [phone]

## 2024-04-17 ENCOUNTER — ANTICOAGULATION THERAPY VISIT (OUTPATIENT)
Dept: ANTICOAGULATION | Facility: OTHER | Age: 54
End: 2024-04-17
Attending: FAMILY MEDICINE
Payer: COMMERCIAL

## 2024-04-17 DIAGNOSIS — I25.2 HISTORY OF ST ELEVATION MYOCARDIAL INFARCTION (STEMI): Primary | ICD-10-CM

## 2024-04-17 DIAGNOSIS — Z79.01 LONG TERM CURRENT USE OF ANTICOAGULANT THERAPY: ICD-10-CM

## 2024-04-17 LAB — INR POINT OF CARE: 2.6 (ref 0.9–1.1)

## 2024-04-17 PROCEDURE — 36416 COLLJ CAPILLARY BLOOD SPEC: CPT | Mod: ZL

## 2024-04-17 NOTE — PROGRESS NOTES
ANTICOAGULATION MANAGEMENT     Bhaskar Zurita 53 year old male is on warfarin with therapeutic INR result. (Goal INR 2.0-3.0)    Recent labs: (last 7 days)     04/17/24  1326   INR 2.6*       ASSESSMENT     Source(s): In person     Warfarin doses taken: Warfarin taken as instructed  Diet: No new diet changes identified  New illness, injury, or hospitalization: No  Medication/supplement changes: None noted  Signs or symptoms of bleeding or clotting: No  Previous INR: Supratherapeutic  Additional findings: None     PLAN     Recommended plan for no diet, medication or health factor changes affecting INR     Dosing Instructions: Continue your current warfarin dose with next INR in 2 weeks       Summary  As of 4/17/2024      Full warfarin instructions:  10 mg every Mon, Thu; 7.5 mg all other days   Next INR check:  5/1/2024               In person    Lab visit scheduled    Education provided: Please call back if any changes to your diet, medications or how you've been taking warfarin    Plan made per [facility] anticoagulation protocol    Maira Wynne RN  Anticoagulation Clinic  4/17/2024    _______________________________________________________________________     Anticoagulation Episode Summary       Current INR goal:  2.0-3.0   TTR:  57.7% (3 mo)   Target end date:  Indefinite   Send INR reminders to:  [facility]    Indications    History of ST elevation myocardial infarction (STEMI) - 12/10/2023 - [facility] [I25.2]  Long term current use of anticoagulant therapy [Z79.01]             Comments:               Anticoagulation Care Providers       Provider Role Specialty Phone number    Lazaro Perez MD Referring Family Medicine [phone]

## 2024-05-01 ENCOUNTER — ANTICOAGULATION THERAPY VISIT (OUTPATIENT)
Dept: ANTICOAGULATION | Facility: OTHER | Age: 54
End: 2024-05-01
Attending: FAMILY MEDICINE
Payer: COMMERCIAL

## 2024-05-01 DIAGNOSIS — I25.2 HISTORY OF ST ELEVATION MYOCARDIAL INFARCTION (STEMI): Primary | ICD-10-CM

## 2024-05-01 DIAGNOSIS — Z79.01 LONG TERM CURRENT USE OF ANTICOAGULANT THERAPY: ICD-10-CM

## 2024-05-01 LAB — INR POINT OF CARE: 4 (ref 0.9–1.1)

## 2024-05-01 PROCEDURE — 36416 COLLJ CAPILLARY BLOOD SPEC: CPT | Mod: ZL

## 2024-05-01 NOTE — PROGRESS NOTES
ANTICOAGULATION MANAGEMENT     Bhaskar Zurita 53 year old male is on warfarin with supratherapeutic INR result. (Goal INR 2.0-3.0)    Recent labs: (last 7 days)     05/01/24  0913   INR 4.0*       ASSESSMENT     Source(s): In person     Warfarin doses taken: Warfarin taken as instructed  Diet: Change in alcohol intake may be affecting INR. Drank more alcohol than normal the last couple days   New illness, injury, or hospitalization: No  Medication/supplement changes: None noted  Signs or symptoms of bleeding or clotting: No  Previous INR: Therapeutic last visit; previously outside of goal range  Additional findings: None     PLAN     Recommended plan for temporary change(s) affecting INR     Dosing Instructions: hold dose then continue your current warfarin dose with next INR in 1 week       Summary  As of 5/1/2024      Full warfarin instructions:  5/1: Hold; Otherwise 10 mg every Mon, Thu; 7.5 mg all other days   Next INR check:  5/8/2024               In person    Lab visit scheduled    Education provided: Please call back if any changes to your diet, medications or how you've been taking warfarin    Plan made per [facility] anticoagulation protocol    Maira Wynne, RN  Anticoagulation Clinic  5/1/2024    _______________________________________________________________________     Anticoagulation Episode Summary       Current INR goal:  2.0-3.0   TTR:  53.9% (3.5 mo)   Target end date:  Indefinite   Send INR reminders to:  [facility]    Indications    History of ST elevation myocardial infarction (STEMI) - 12/10/2023 - [facility] [I25.2]  Long term current use of anticoagulant therapy [Z79.01]             Comments:               Anticoagulation Care Providers       Provider Role Specialty Phone number    Lazaro Perez MD Referring Family Medicine [phone]

## 2024-05-09 ENCOUNTER — ANTICOAGULATION THERAPY VISIT (OUTPATIENT)
Dept: ANTICOAGULATION | Facility: OTHER | Age: 54
End: 2024-05-09
Attending: FAMILY MEDICINE
Payer: COMMERCIAL

## 2024-05-09 DIAGNOSIS — Z79.01 LONG TERM CURRENT USE OF ANTICOAGULANT THERAPY: ICD-10-CM

## 2024-05-09 DIAGNOSIS — I25.2 HISTORY OF ST ELEVATION MYOCARDIAL INFARCTION (STEMI): Primary | ICD-10-CM

## 2024-05-09 LAB — INR POINT OF CARE: 2.5 (ref 0.9–1.1)

## 2024-05-09 PROCEDURE — 36416 COLLJ CAPILLARY BLOOD SPEC: CPT | Mod: ZL

## 2024-05-09 NOTE — PROGRESS NOTES
ANTICOAGULATION MANAGEMENT     Bhaskar Zurita 53 year old male is on warfarin with therapeutic INR result. (Goal INR 2.0-3.0)    Recent labs: (last 7 days)     05/09/24  0911   INR 2.5*       ASSESSMENT     Source(s): In person     Warfarin doses taken: Warfarin taken as instructed  Diet: No new diet changes identified  New illness, injury, or hospitalization: No  Medication/supplement changes: None noted  Signs or symptoms of bleeding or clotting: No  Previous INR: Supratherapeutic  Additional findings: None     PLAN     Recommended plan for no diet, medication or health factor changes affecting INR     Dosing Instructions: Continue your current warfarin dose with next INR in 2 weeks       Summary  As of 5/9/2024      Full warfarin instructions:  10 mg every Mon, Thu; 7.5 mg all other days   Next INR check:  5/23/2024               In person    Lab visit scheduled    Education provided: Please call back if any changes to your diet, medications or how you've been taking warfarin    Plan made per [facility] anticoagulation protocol    Maira Wynne RN  Anticoagulation Clinic  5/9/2024    _______________________________________________________________________     Anticoagulation Episode Summary       Current INR goal:  2.0-3.0   TTR:  52.4% (3.7 mo)   Target end date:  Indefinite   Send INR reminders to:  [facility]    Indications    History of ST elevation myocardial infarction (STEMI) - 12/10/2023 - [facility] [I25.2]  Long term current use of anticoagulant therapy [Z79.01]             Comments:               Anticoagulation Care Providers       Provider Role Specialty Phone number    Lazaro Perez MD Referring Family Medicine [phone]

## 2024-05-28 ENCOUNTER — ANTICOAGULATION THERAPY VISIT (OUTPATIENT)
Dept: ANTICOAGULATION | Facility: OTHER | Age: 54
End: 2024-05-28
Attending: FAMILY MEDICINE
Payer: COMMERCIAL

## 2024-05-28 DIAGNOSIS — Z79.01 LONG TERM CURRENT USE OF ANTICOAGULANT THERAPY: ICD-10-CM

## 2024-05-28 DIAGNOSIS — I25.2 HISTORY OF ST ELEVATION MYOCARDIAL INFARCTION (STEMI): Primary | ICD-10-CM

## 2024-05-28 LAB — INR POINT OF CARE: 3.5 (ref 0.9–1.1)

## 2024-05-28 PROCEDURE — 36416 COLLJ CAPILLARY BLOOD SPEC: CPT | Mod: ZL

## 2024-05-28 NOTE — PROGRESS NOTES
ANTICOAGULATION MANAGEMENT     Bhaskar Zuirta 53 year old male is on warfarin with supratherapeutic INR result. (Goal INR 2.0-3.0)    Recent labs: (last 7 days)     05/28/24  1029   INR 3.5*       ASSESSMENT     Source(s): In person     Warfarin doses taken: Warfarin taken as instructed  Diet: No new diet changes identified  New illness, injury, or hospitalization: No  Medication/supplement changes: None noted  Signs or symptoms of bleeding or clotting: No  Previous INR: Therapeutic last visit; previously outside of goal range  Additional findings: None     PLAN     Recommended plan for no diet, medication or health factor changes affecting INR     Dosing Instructions: decrease your warfarin dose (8.7% change) with next INR in 2 weeks       Summary  As of 5/28/2024      Full warfarin instructions:  7.5 mg every day   Next INR check:  6/11/2024               In person    Lab visit scheduled    Education provided: Please call back if any changes to your diet, medications or how you've been taking warfarin    Plan made per [facility] anticoagulation protocol    Maira Wynne RN  Anticoagulation Clinic  5/28/2024    _______________________________________________________________________     Anticoagulation Episode Summary       Current INR goal:  2.0-3.0   TTR:  52.1% (4.4 mo)   Target end date:  Indefinite   Send INR reminders to:  [facility]    Indications    History of ST elevation myocardial infarction (STEMI) - 12/10/2023 - [facility] [I25.2]  Long term current use of anticoagulant therapy [Z79.01]             Comments:               Anticoagulation Care Providers       Provider Role Specialty Phone number    Lazaro Perez MD Referring Family Medicine [phone]

## 2024-06-17 ENCOUNTER — ANTICOAGULATION THERAPY VISIT (OUTPATIENT)
Dept: ANTICOAGULATION | Facility: OTHER | Age: 54
End: 2024-06-17
Attending: FAMILY MEDICINE
Payer: COMMERCIAL

## 2024-06-17 DIAGNOSIS — Z79.01 LONG TERM CURRENT USE OF ANTICOAGULANT THERAPY: ICD-10-CM

## 2024-06-17 DIAGNOSIS — I25.2 HISTORY OF ST ELEVATION MYOCARDIAL INFARCTION (STEMI): Primary | ICD-10-CM

## 2024-06-17 LAB — INR POINT OF CARE: 1.4 (ref 0.9–1.1)

## 2024-06-17 PROCEDURE — 36416 COLLJ CAPILLARY BLOOD SPEC: CPT | Mod: ZL

## 2024-06-17 NOTE — PROGRESS NOTES
ANTICOAGULATION MANAGEMENT     Bhaskar Zurita 53 year old male is on warfarin with subtherapeutic INR result. (Goal INR 2.0-3.0)    Recent labs: (last 7 days)     06/17/24  0828   INR 1.4*       ASSESSMENT     Source(s): In person     Warfarin doses taken: Less warfarin taken than planned which may be affecting INR  Diet: Change in alcohol intake may be affecting INR. Has been drinking alcohol so has been only taking one 5 mg daily of warfarin   New illness, injury, or hospitalization: No  Medication/supplement changes: None noted  Signs or symptoms of bleeding or clotting: No  Previous INR: Supratherapeutic  Additional findings: None     PLAN     Recommended plan for temporary change(s) affecting INR     Dosing Instructions: booster dose then continue your current warfarin dose with next INR in 2 weeks       Summary  As of 6/17/2024      Full warfarin instructions:  6/17: 10 mg; Otherwise 7.5 mg every day   Next INR check:  6/24/2024               In person    Lab visit scheduled    Education provided: Please call back if any changes to your diet, medications or how you've been taking warfarin    Plan made per [facility] anticoagulation protocol    Annalise Henao RN  Anticoagulation Clinic  6/17/2024    _______________________________________________________________________     Anticoagulation Episode Summary       Current INR goal:  2.0-3.0   TTR:  51.5% (5 mo)   Target end date:  Indefinite   Send INR reminders to:  [facility]    Indications    History of ST elevation myocardial infarction (STEMI) - 12/10/2023 - [facility] [I25.2]  Long term current use of anticoagulant therapy [Z79.01]             Comments:               Anticoagulation Care Providers       Provider Role Specialty Phone number    Lazaro Perez MD Referring Family Medicine [phone]

## 2024-06-24 ENCOUNTER — ANTICOAGULATION THERAPY VISIT (OUTPATIENT)
Dept: ANTICOAGULATION | Facility: OTHER | Age: 54
End: 2024-06-24
Payer: COMMERCIAL

## 2024-06-24 DIAGNOSIS — I25.2 HISTORY OF ST ELEVATION MYOCARDIAL INFARCTION (STEMI): Primary | ICD-10-CM

## 2024-06-24 DIAGNOSIS — Z79.01 LONG TERM CURRENT USE OF ANTICOAGULANT THERAPY: ICD-10-CM

## 2024-06-24 LAB — INR POINT OF CARE: 2.9 (ref 0.9–1.1)

## 2024-06-24 PROCEDURE — 36416 COLLJ CAPILLARY BLOOD SPEC: CPT | Mod: ZL

## 2024-06-24 NOTE — PROGRESS NOTES
ANTICOAGULATION MANAGEMENT     Bhaskar Zurita 53 year old male is on warfarin with therapeutic INR result. (Goal INR 2.0-3.0)    Recent labs: (last 7 days)     06/24/24  0832   INR 2.9*       ASSESSMENT     Source(s): In person     Warfarin doses taken: Warfarin taken as instructed and Patient states he has been taking the 7.5 mg daily, pretty much all the time. He would not elaborate.   Diet: No new diet changes identified  New illness, injury, or hospitalization: No  Medication/supplement changes: None noted  Signs or symptoms of bleeding or clotting: No  Previous INR: Subtherapeutic  Additional findings: None     PLAN     Recommended plan for no diet, medication or health factor changes affecting INR     Dosing Instructions: Continue your current warfarin dose with next INR in 2 weeks       Summary  As of 6/24/2024      Full warfarin instructions:  7.5 mg every day   Next INR check:  7/8/2024               In person    Lab visit scheduled    Education provided: Please call back if any changes to your diet, medications or how you've been taking warfarin    Plan made per [facility] anticoagulation protocol    Maira Wynne RN  Anticoagulation Clinic  6/24/2024    _______________________________________________________________________     Anticoagulation Episode Summary       Current INR goal:  2.0-3.0   TTR:  51.8% (5.3 mo)   Target end date:  Indefinite   Send INR reminders to:  [facility]    Indications    History of ST elevation myocardial infarction (STEMI) - 12/10/2023 - [facility] [I25.2]  Long term current use of anticoagulant therapy [Z79.01]             Comments:               Anticoagulation Care Providers       Provider Role Specialty Phone number    Lazaro Perez MD Referring Family Medicine [phone]

## 2024-08-06 ENCOUNTER — TELEPHONE (OUTPATIENT)
Dept: FAMILY MEDICINE | Facility: OTHER | Age: 54
End: 2024-08-06
Payer: COMMERCIAL

## 2024-08-06 NOTE — TELEPHONE ENCOUNTER
ANTICOAGULATION     Bhaskar Zurita is overdue for an INR check.     Spoke with Bhaskar and scheduled lab appointment on 8/12/24    Patti Masterson RN

## 2024-08-12 ENCOUNTER — ANTICOAGULATION THERAPY VISIT (OUTPATIENT)
Dept: ANTICOAGULATION | Facility: OTHER | Age: 54
End: 2024-08-12
Attending: FAMILY MEDICINE
Payer: COMMERCIAL

## 2024-08-12 DIAGNOSIS — I25.2 HISTORY OF ST ELEVATION MYOCARDIAL INFARCTION (STEMI): Primary | ICD-10-CM

## 2024-08-12 DIAGNOSIS — Z79.01 LONG TERM CURRENT USE OF ANTICOAGULANT THERAPY: ICD-10-CM

## 2024-08-12 LAB — INR POINT OF CARE: 4.6 (ref 0.9–1.1)

## 2024-08-12 PROCEDURE — 36416 COLLJ CAPILLARY BLOOD SPEC: CPT | Mod: ZL

## 2024-08-12 NOTE — PROGRESS NOTES
ANTICOAGULATION MANAGEMENT     Bhaskar Zurita 54 year old male is on warfarin with supratherapeutic INR result. (Goal INR 2.0-3.0)    Recent labs: (last 7 days)     08/12/24  0851   INR 4.6*       ASSESSMENT     Source(s): In person     Warfarin doses taken: Warfarin taken as instructed  Diet: Change in alcohol intake may be affecting INR. Patient verbalized that he drank more beers over the weekend   New illness, injury, or hospitalization: No  Medication/supplement changes: None noted  Signs or symptoms of bleeding or clotting: No  Previous INR: Therapeutic last visit; previously outside of goal range  Additional findings: None     PLAN     Recommended plan for temporary change(s) affecting INR     Dosing Instructions: hold dose then continue your current warfarin dose with next INR in 10 days       Summary  As of 8/12/2024      Full warfarin instructions:  8/12: Hold; Otherwise 7.5 mg every day   Next INR check:  8/22/2024               In person    Patient offered & declined to schedule next visit    Education provided: Please call back if any changes to your diet, medications or how you've been taking warfarin, Monitoring for bleeding signs and symptoms, and When to seek medical attention/emergency care    Plan made per [facility] anticoagulation protocol    Patti Masterson RN  Anticoagulation Clinic  8/12/2024    _______________________________________________________________________     Anticoagulation Episode Summary       Current INR goal:  2.0-3.0   TTR:  41.0% (6.9 mo)   Target end date:  Indefinite   Send INR reminders to:  [facility]    Indications    History of ST elevation myocardial infarction (STEMI) - 12/10/2023 - [facility] [I25.2]  Long term current use of anticoagulant therapy [Z79.01]             Comments:               Anticoagulation Care Providers       Provider Role Specialty Phone number    Lazaro Perez MD Referring Family Medicine [phone]

## 2024-08-22 ENCOUNTER — ANTICOAGULATION THERAPY VISIT (OUTPATIENT)
Dept: ANTICOAGULATION | Facility: OTHER | Age: 54
End: 2024-08-22
Payer: COMMERCIAL

## 2024-08-22 DIAGNOSIS — Z79.01 LONG TERM CURRENT USE OF ANTICOAGULANT THERAPY: ICD-10-CM

## 2024-08-22 DIAGNOSIS — I25.2 HISTORY OF ST ELEVATION MYOCARDIAL INFARCTION (STEMI): Primary | ICD-10-CM

## 2024-08-22 LAB — INR POINT OF CARE: 3.7 (ref 0.9–1.1)

## 2024-08-22 PROCEDURE — 36416 COLLJ CAPILLARY BLOOD SPEC: CPT | Mod: ZL

## 2024-08-22 NOTE — PROGRESS NOTES
ANTICOAGULATION MANAGEMENT     Bhaskar Zurita 54 year old male is on warfarin with supratherapeutic INR result. (Goal INR 2.0-3.0)    Recent labs: (last 7 days)     08/22/24  0855   INR 3.7*       ASSESSMENT     Source(s): In person     Warfarin doses taken: Warfarin taken as instructed  Diet: No new diet changes identified  New illness, injury, or hospitalization: No  Medication/supplement changes: None noted  Signs or symptoms of bleeding or clotting: No  Previous INR: Supratherapeutic  Additional findings: None     PLAN     Recommended plan for no diet, medication or health factor changes affecting INR     Dosing Instructions: decrease your warfarin dose (9.5% change) with next INR in 2 weeks       Summary  As of 8/22/2024      Full warfarin instructions:  5 mg every Mon, Thu; 7.5 mg all other days   Next INR check:  9/5/2024               In person    Patient offered & declined to schedule next visit    Education provided: Please call back if any changes to your diet, medications or how you've been taking warfarin    Plan made per [facility] anticoagulation protocol    Patti Masterson RN  Anticoagulation Clinic  8/22/2024    _______________________________________________________________________     Anticoagulation Episode Summary       Current INR goal:  2.0-3.0   TTR:  39.1% (7.2 mo)   Target end date:  Indefinite   Send INR reminders to:  [facility]    Indications    History of ST elevation myocardial infarction (STEMI) - 12/10/2023 - [facility] [I25.2]  Long term current use of anticoagulant therapy [Z79.01]             Comments:               Anticoagulation Care Providers       Provider Role Specialty Phone number    Lazaro Perez MD Referring Family Medicine [phone]

## 2024-10-08 DIAGNOSIS — I25.2 HISTORY OF ST ELEVATION MYOCARDIAL INFARCTION (STEMI): ICD-10-CM

## 2024-10-08 DIAGNOSIS — Z79.01 LONG TERM CURRENT USE OF ANTICOAGULANT THERAPY: ICD-10-CM

## 2024-10-09 RX ORDER — WARFARIN SODIUM 5 MG/1
TABLET ORAL
Qty: 40 TABLET | Refills: 0 | OUTPATIENT
Start: 2024-10-09

## 2024-10-09 NOTE — TELEPHONE ENCOUNTER
ANTICOAGULATION MANAGEMENT:  Medication Refill    Anticoagulation Summary  As of 8/22/2024      Warfarin maintenance plan:  5 mg (5 mg x 1) every Mon, Thu; 7.5 mg (5 mg x 1.5) all other days   Next INR check:  9/5/2024   Target end date:  Indefinite    Indications    History of ST elevation myocardial infarction (STEMI) - 12/10/2023 - [facility] [I25.2]  Long term current use of anticoagulant therapy [Z79.01]                 Anticoagulation Care Providers       Provider Role Specialty Phone number    Lazaro Perez MD Referring Family Medicine [phone]            Visit with referring provider/group within last year: Yes 1/2025    [facility] referral signed within last year: Yes    Bhaskar does NOT meet all criteria for refill: > 2 weeks overdue for lab monitoring . 30 day lynn fill approved; patient notified to schedule labs per [facility] protocol   Refusing this refill request today as patient is going to get an INR done on 10/11/24 and will refill his warfarin on that day per protocol. Patient verbalized that he has enough warfarin till 10/11/24 when we can check his INR.  Patti Masterson, RN  Anticoagulation Clinic

## 2024-10-11 ENCOUNTER — ANTICOAGULATION THERAPY VISIT (OUTPATIENT)
Dept: ANTICOAGULATION | Facility: OTHER | Age: 54
End: 2024-10-11
Attending: FAMILY MEDICINE
Payer: MEDICAID

## 2024-10-11 DIAGNOSIS — I25.2 HISTORY OF ST ELEVATION MYOCARDIAL INFARCTION (STEMI): Primary | ICD-10-CM

## 2024-10-11 DIAGNOSIS — I25.110 CORONARY ARTERY DISEASE INVOLVING NATIVE CORONARY ARTERY OF NATIVE HEART WITH UNSTABLE ANGINA PECTORIS (H): ICD-10-CM

## 2024-10-11 DIAGNOSIS — Z79.01 LONG TERM CURRENT USE OF ANTICOAGULANT THERAPY: ICD-10-CM

## 2024-10-11 LAB — INR POINT OF CARE: 2.3 (ref 0.9–1.1)

## 2024-10-11 PROCEDURE — 85610 PROTHROMBIN TIME: CPT | Mod: ZL,QW

## 2024-10-11 RX ORDER — NITROGLYCERIN 0.4 MG/1
0.4 TABLET SUBLINGUAL EVERY 5 MIN PRN
Qty: 25 TABLET | Refills: 1 | Status: SHIPPED | OUTPATIENT
Start: 2024-10-11

## 2024-10-11 RX ORDER — WARFARIN SODIUM 5 MG/1
TABLET ORAL
Qty: 120 TABLET | Refills: 0 | Status: SHIPPED | OUTPATIENT
Start: 2024-10-11

## 2024-10-11 NOTE — TELEPHONE ENCOUNTER
Patient was seen in clinic today for INR check and verbalized that he needs a refill of his Nitroglycerin and that he has been out of it for 3-4 weeks.    Disp Refills Start End JACKELINE   nitroGLYcerin (NITROSTAT) 0.4 MG sublingual tablet -- -- 1/7/2024 -- --   Sig - Route: Place 0.4 mg under the tongue - Sublingual   Class: Historical   Order: [id number]

## 2024-10-11 NOTE — PROGRESS NOTES
"ANTICOAGULATION MANAGEMENT     Bhaskar Zurita 54 year old male is on warfarin with therapeutic INR result. (Goal INR 2.0-3.0)    Recent labs: (last 7 days)     10/11/24  1036   INR 2.3*       ASSESSMENT     Source(s): Chart Review and In person      Warfarin doses taken:  Patient verbalized that he takes 5 mg on the days he drinks and could not tell me the exact dose he was taking in the last 2 week on average when he self lowered his dose to 5 mg on the days he drinks   Diet: Change in alcohol intake may be affecting INR. Patient verbalized that he is going to try to go sober the next 2 weeks   New illness, injury, or hospitalization: No  Medication/supplement changes:  Needs a refill of his nitro-will send message to PCP's nurse  Signs or symptoms of bleeding or clotting: No  Previous INR: Supratherapeutic  Additional findings:  When patient was asked if he has been taking 5 mg on the days he drinks, on average how many of his 7.5 mg days he has changed to 5 mg in the last 2 weeks, patient could not give me an answer so I could make a better assessment of future dosing per what he was taking in the last 2 weeks. After patient verbalized that he is going to try and go sober the next 2 weeks he said he was going to take 7.5 daily. I explained to him that is quite the increase in weekly total and did not recommend that. Patient got very agitated and said \"just print off what every you're going to print off\" Went over risks with patient and he took his printout and walked out of the room.        PLAN     Recommended plan for ongoing change(s) affecting INR     Dosing Instructions: Continue your current warfarin dose with next INR in 2 weeks       Summary  As of 10/11/2024      Full warfarin instructions:  5 mg every Mon, Thu; 7.5 mg all other days   Next INR check:  10/25/2024               In person    Patient offered & declined to schedule next visit    Education provided: Please call back if any changes to your " diet, medications or how you've been taking warfarin, Potential interaction between warfarin and alcohol, Importance of following up at instructed interval, and Importance of taking warfarin as instructed    Plan made per [facility] anticoagulation protocol    Patti Masterson RN  Anticoagulation Clinic  10/11/2024    _______________________________________________________________________     Anticoagulation Episode Summary       Current INR goal:  2.0-3.0   TTR:  41.1% (8.9 mo)   Target end date:  Indefinite   Send INR reminders to:  [facility]    Indications    History of ST elevation myocardial infarction (STEMI) - 12/10/2023 - [facility] [I25.2]  Long term current use of anticoagulant therapy [Z79.01]             Comments:               Anticoagulation Care Providers       Provider Role Specialty Phone number    Lazaro Perez MD Referring Family Medicine [phone]

## 2024-10-11 NOTE — TELEPHONE ENCOUNTER
LOV: 1/11/24 (hospital follow up - Denise Griggs).    No upcoming appointments.    Routing to covering provider for refill consideration, as PCP/provider is out of clinic >48 hours or Pt is completely out of medication and provider is out of the clinic today.    Unable to complete prescription refill per RN Medication Refill Policy. Gloria Garcia RN .............. 10/11/2024  11:04 AM

## 2024-10-11 NOTE — PROGRESS NOTES
ANTICOAGULATION MANAGEMENT:  Medication Refill    Anticoagulation Summary  As of 10/11/2024      Warfarin maintenance plan:  5 mg (5 mg x 1) every Mon, Thu; 7.5 mg (5 mg x 1.5) all other days   Next INR check:  10/25/2024   Target end date:  Indefinite    Indications    History of ST elevation myocardial infarction (STEMI) - 12/10/2023 - [facility] [I25.2]  Long term current use of anticoagulant therapy [Z79.01]                 Anticoagulation Care Providers       Provider Role Specialty Phone number    Lazaro Perez MD Referring Family Medicine [phone]            Visit with referring provider/group within last year: Yes 1/11/24    [facility] referral signed within last year: Yes    Bhaskar meets all criteria for refill (current [facility] referral, visit with referring provider/group in last 15 months unless directed to return in 2 years in last referring provider visit note, lab monitoring up to date or not exceeding 2 weeks overdue). Rx instructions and quantity supplied updated to match patient's current dosing plan.  90 day supply with 0 due to compliance and safety reasons refills granted per [facility] protocol     Patti Masterson, RN  Anticoagulation Clinic

## 2024-10-31 ENCOUNTER — ANTICOAGULATION THERAPY VISIT (OUTPATIENT)
Dept: ANTICOAGULATION | Facility: OTHER | Age: 54
End: 2024-10-31
Attending: FAMILY MEDICINE
Payer: MEDICAID

## 2024-10-31 ENCOUNTER — TRANSFERRED RECORDS (OUTPATIENT)
Dept: HEALTH INFORMATION MANAGEMENT | Facility: OTHER | Age: 54
End: 2024-10-31

## 2024-10-31 DIAGNOSIS — Z79.01 LONG TERM CURRENT USE OF ANTICOAGULANT THERAPY: ICD-10-CM

## 2024-10-31 DIAGNOSIS — I25.2 HISTORY OF ST ELEVATION MYOCARDIAL INFARCTION (STEMI): Primary | ICD-10-CM

## 2024-10-31 LAB
EJECTION FRACTION: NORMAL %
INR POINT OF CARE: 2.5 (ref 0.9–1.1)

## 2024-10-31 PROCEDURE — 85610 PROTHROMBIN TIME: CPT | Mod: ZL,QW

## 2024-10-31 NOTE — PROGRESS NOTES
ANTICOAGULATION MANAGEMENT     Bhaskar Zurita 54 year old male is on warfarin with therapeutic INR result. (Goal INR 2.0-3.0)    Recent labs: (last 7 days)     10/31/24  0914   INR 2.5*       ASSESSMENT     Source(s): Chart Review and In person      Warfarin doses taken: Warfarin taken as instructed  Diet: No new diet changes identified  New illness, injury, or hospitalization: No  Medication/supplement changes: None noted  Signs or symptoms of bleeding or clotting: No  Previous INR: Therapeutic last visit; previously outside of goal range  Additional findings: None       PLAN     Recommended plan for no diet, medication or health factor changes affecting INR     Dosing Instructions: Continue your current warfarin dose with next INR in 4 weeks       Summary  As of 10/31/2024      Full warfarin instructions:  5 mg every Mon, Thu; 7.5 mg all other days   Next INR check:  11/26/2024               In person    Patient offered & declined to schedule next visit    Education provided: Please call back if any changes to your diet, medications or how you've been taking warfarin    Plan made per [facility] anticoagulation protocol    Patti Masterson RN  Anticoagulation Clinic  10/31/2024    _______________________________________________________________________     Anticoagulation Episode Summary       Current INR goal:  2.0-3.0   TTR:  45.2% (9.6 mo)   Target end date:  Indefinite   Send INR reminders to:  [facility]    Indications    History of ST elevation myocardial infarction (STEMI) - 12/10/2023 - [facility] [I25.2]  Long term current use of anticoagulant therapy [Z79.01]             Comments:  --             Anticoagulation Care Providers       Provider Role Specialty Phone number    Lazaro Perez MD Referring Family Medicine [phone]

## 2024-11-26 ENCOUNTER — ANTICOAGULATION THERAPY VISIT (OUTPATIENT)
Dept: ANTICOAGULATION | Facility: OTHER | Age: 54
End: 2024-11-26
Attending: FAMILY MEDICINE
Payer: MEDICAID

## 2024-11-26 DIAGNOSIS — Z79.01 LONG TERM CURRENT USE OF ANTICOAGULANT THERAPY: ICD-10-CM

## 2024-11-26 DIAGNOSIS — I25.2 HISTORY OF ST ELEVATION MYOCARDIAL INFARCTION (STEMI): Primary | ICD-10-CM

## 2024-11-26 LAB — INR POINT OF CARE: 2.3 (ref 0.9–1.1)

## 2024-11-26 PROCEDURE — 85610 PROTHROMBIN TIME: CPT | Mod: ZL,QW

## 2024-11-26 PROCEDURE — 36416 COLLJ CAPILLARY BLOOD SPEC: CPT | Mod: ZL

## 2024-11-26 NOTE — PROGRESS NOTES
ANTICOAGULATION MANAGEMENT     Bhaskar Zurita 54 year old male is on warfarin with therapeutic INR result. (Goal INR 2.0-3.0)    Recent labs: (last 7 days)     11/26/24  0912   INR 2.3*       ASSESSMENT     Source(s): Chart Review and In person      Warfarin doses taken: Warfarin taken as instructed  Diet: No new diet changes identified  New illness, injury, or hospitalization: No  Medication/supplement changes: None noted  Signs or symptoms of bleeding or clotting: No  Previous INR: Therapeutic last 2(+) visits  Additional findings: None       PLAN     Recommended plan for no diet, medication or health factor changes affecting INR     Dosing Instructions: Continue your current warfarin dose with next INR in 5 weeks       Summary  As of 11/26/2024      Full warfarin instructions:  5 mg every Mon, Thu; 7.5 mg all other days   Next INR check:  12/31/2024               In person    Patient offered & declined to schedule next visit    Education provided: Please call back if any changes to your diet, medications or how you've been taking warfarin    Plan made per [facility] anticoagulation protocol    Patti Masterson RN  Anticoagulation Clinic  11/26/2024    _______________________________________________________________________     Anticoagulation Episode Summary       Current INR goal:  2.0-3.0   TTR:  49.8% (10.4 mo)   Target end date:  Indefinite   Send INR reminders to:  [facility]    Indications    History of ST elevation myocardial infarction (STEMI) - 12/10/2023 - [facility] [I25.2]  Long term current use of anticoagulant therapy [Z79.01]             Comments:  --             Anticoagulation Care Providers       Provider Role Specialty Phone number    Lazaro Perez MD Referring Family Medicine [phone]

## 2025-01-02 ENCOUNTER — ANTICOAGULATION THERAPY VISIT (OUTPATIENT)
Dept: ANTICOAGULATION | Facility: OTHER | Age: 55
End: 2025-01-02
Payer: MEDICAID

## 2025-01-02 DIAGNOSIS — Z79.01 LONG TERM CURRENT USE OF ANTICOAGULANT THERAPY: ICD-10-CM

## 2025-01-02 DIAGNOSIS — I25.2 HISTORY OF ST ELEVATION MYOCARDIAL INFARCTION (STEMI): Primary | ICD-10-CM

## 2025-01-02 LAB — INR POINT OF CARE: 2.3 (ref 0.9–1.1)

## 2025-01-02 PROCEDURE — 85610 PROTHROMBIN TIME: CPT | Mod: ZL,QW

## 2025-01-02 NOTE — PROGRESS NOTES
ANTICOAGULATION MANAGEMENT     Bhaskar Zurita 54 year old male is on warfarin with therapeutic INR result. (Goal INR 2.0-3.0)    Recent labs: (last 7 days)     1/2/25  1050   INR 2.3*         ASSESSMENT     Source(s): Chart Review and in person      Warfarin doses taken: Warfarin taken as instructed  Diet: No new diet changes identified  New illness, injury, or hospitalization: No  Medication/supplement changes: None noted  Signs or symptoms of bleeding or clotting: No  Previous INR: Therapeutic last 2(+) visits  Additional findings: None       PLAN     Recommended plan for no diet, medication or health factor changes affecting INR     Dosing Instructions: Continue your current warfarin dose with next INR in 6 weeks       Summary  As of 1/2/2025      Full warfarin instructions:  5 mg every Mon, Thu; 7.5 mg all other days   Next INR check:  2/13/2025           Instructions given in person, patient verbalizes understanding.      Lab visit scheduled    Education provided: Please call back if any changes to your diet, medications or how you've been taking warfarin    Plan made per [facility] anticoagulation protocol    Maira Wynne RN  Anticoagulation Clinic  1/2/2025    _______________________________________________________________________     Anticoagulation Episode Summary       Current INR goal:  2.0-3.0   TTR:  55.1% (11.7 mo)   Target end date:  Indefinite   Send INR reminders to:  [facility]    Indications    History of ST elevation myocardial infarction (STEMI) - 12/10/2023 - [facility] [I25.2]  Long term current use of anticoagulant therapy [Z79.01]             Comments:  --             Anticoagulation Care Providers       Provider Role Specialty Phone number    Lazaro Perez MD Referring Family Medicine [phone]

## 2025-01-20 DIAGNOSIS — Z79.01 LONG TERM CURRENT USE OF ANTICOAGULANT THERAPY: ICD-10-CM

## 2025-01-20 DIAGNOSIS — I25.2 HISTORY OF ST ELEVATION MYOCARDIAL INFARCTION (STEMI): ICD-10-CM

## 2025-01-20 DIAGNOSIS — I25.110 CORONARY ARTERY DISEASE INVOLVING NATIVE CORONARY ARTERY OF NATIVE HEART WITH UNSTABLE ANGINA PECTORIS (H): ICD-10-CM

## 2025-01-20 RX ORDER — NITROGLYCERIN 0.4 MG/1
0.4 TABLET SUBLINGUAL EVERY 5 MIN PRN
Qty: 25 TABLET | Refills: 1 | Status: SHIPPED | OUTPATIENT
Start: 2025-01-20

## 2025-01-20 RX ORDER — WARFARIN SODIUM 5 MG/1
TABLET ORAL
Qty: 120 TABLET | Refills: 0 | Status: SHIPPED | OUTPATIENT
Start: 2025-01-20

## 2025-01-20 NOTE — TELEPHONE ENCOUNTER
ANTICOAGULATION MANAGEMENT:  Medication Refill    Anticoagulation Summary  As of 1/2/2025      Warfarin maintenance plan:  5 mg (5 mg x 1) every Mon, Thu; 7.5 mg (5 mg x 1.5) all other days   Next INR check:  2/13/2025   Target end date:  Indefinite    Indications    History of ST elevation myocardial infarction (STEMI) - 12/10/2023 - [facility] [I25.2]  Long term current use of anticoagulant therapy [Z79.01]                 Anticoagulation Care Providers       Provider Role Specialty Phone number    Lazaro Perez MD Referring Family Medicine [phone]            Refill Criteria    Visit with referring provider/group: Meets criteria: visit within referring provider group in the last 15 months on 12/21/23    [facility] referral last signed: 11/26/2024; within last year:  Yes    Lab monitoring not exceeding 2 weeks overdue: Yes    Bhaskar meets all criteria for refill. Rx instructions and quantity supplied updated to match patient's current dosing plan. Warfarin 90 day supply with 1 refill granted per [facility] protocol     Sera Sams RN  Anticoagulation Clinic

## 2025-02-13 ENCOUNTER — TELEPHONE (OUTPATIENT)
Dept: FAMILY MEDICINE | Facility: OTHER | Age: 55
End: 2025-02-13

## 2025-02-13 ENCOUNTER — ANTICOAGULATION THERAPY VISIT (OUTPATIENT)
Dept: ANTICOAGULATION | Facility: OTHER | Age: 55
End: 2025-02-13
Attending: FAMILY MEDICINE
Payer: MEDICAID

## 2025-02-13 DIAGNOSIS — I25.2 HISTORY OF ST ELEVATION MYOCARDIAL INFARCTION (STEMI): Primary | ICD-10-CM

## 2025-02-13 DIAGNOSIS — Z79.01 LONG TERM CURRENT USE OF ANTICOAGULANT THERAPY: ICD-10-CM

## 2025-02-13 LAB — INR POINT OF CARE: 4.2 (ref 0.9–1.1)

## 2025-02-13 PROCEDURE — 85610 PROTHROMBIN TIME: CPT | Mod: ZL,QW

## 2025-02-13 NOTE — PROGRESS NOTES
ANTICOAGULATION MANAGEMENT     Bhaskar Zurita 54 year old male is on warfarin with supratherapeutic INR result. (Goal INR 2.0-3.0)    Recent labs: (last 7 days)     02/13/25  0918   INR 4.2*       ASSESSMENT     Source(s):  In person       Warfarin doses taken: Warfarin taken as instructed  Diet: Decreased greens/vitamin K in diet; plans to resume previous intake  Medication/supplement changes: None noted  New illness, injury, or hospitalization: No  Signs or symptoms of bleeding or clotting: No  Previous result: Therapeutic last 2(+) visits  Additional findings:  Patient has a couple days left of his lasix.        PLAN     Recommended plan for temporary change(s) affecting INR     Dosing Instructions: hold dose then continue your current warfarin dose with next INR in 10 weeks       Summary  As of 2/13/2025      Full warfarin instructions:  2/14: Hold; Otherwise 5 mg every Mon, Thu; 7.5 mg all other days   Next INR check:  2/24/2025               In person     Lab visit scheduled    Education provided: Please call back if any changes to your diet, medications or how you've been taking warfarin    Plan made per [facility] anticoagulation protocol    Aiden Parker RN  2/13/2025  Anticoagulation Clinic  [employer] for routing messages: p [facility]  [facility] patient phone line: [phone]        _______________________________________________________________________     Anticoagulation Episode Summary       Current INR goal:  2.0-3.0   TTR:  51.3% (1 y)   Target end date:  Indefinite   Send INR reminders to:  [facility]    Indications    History of ST elevation myocardial infarction (STEMI) - 12/10/2023 - [facility] [I25.2]  Long term current use of anticoagulant therapy [Z79.01]             Comments:  --             Anticoagulation Care Providers       Provider Role Specialty Phone number    Lazaro Perez MD Referring Family Medicine [phone]

## 2025-02-13 NOTE — TELEPHONE ENCOUNTER
Patient has 3 days left of his Lasix. Per chart review, 1/19/24 patient had rx written by dr at [facility] but has no refills. This was sent to Dr. Perez who signed and gave a year supply. Patient became a little agitated and asked for [facility] number to call during his INR appt. Patient called and said they will send a refill to [facility].     Aiden Parker RN on 2/13/2025 at 9:32 AM

## 2025-02-24 ENCOUNTER — ANTICOAGULATION THERAPY VISIT (OUTPATIENT)
Dept: ANTICOAGULATION | Facility: OTHER | Age: 55
End: 2025-02-24
Attending: FAMILY MEDICINE
Payer: MEDICAID

## 2025-02-24 DIAGNOSIS — Z79.01 LONG TERM CURRENT USE OF ANTICOAGULANT THERAPY: ICD-10-CM

## 2025-02-24 DIAGNOSIS — I25.2 HISTORY OF ST ELEVATION MYOCARDIAL INFARCTION (STEMI): Primary | ICD-10-CM

## 2025-02-24 LAB — INR POINT OF CARE: 4.4 (ref 0.9–1.1)

## 2025-02-24 PROCEDURE — 85610 PROTHROMBIN TIME: CPT | Mod: ZL,QW

## 2025-02-24 PROCEDURE — 36416 COLLJ CAPILLARY BLOOD SPEC: CPT | Mod: ZL

## 2025-02-24 NOTE — PROGRESS NOTES
ANTICOAGULATION MANAGEMENT     Bhaskar Zurita 54 year old male is on warfarin with supratherapeutic INR result. (Goal INR 2.0-3.0)    Recent labs: (last 7 days)     02/24/25  0818   INR 4.4*       ASSESSMENT     Source(s): Chart Review and In person      Warfarin doses taken: Warfarin taken as instructed  Diet: Change in alcohol intake may be affecting INR. Patient has been drinking roughly 2-3 drinks almost every night   New illness, injury, or hospitalization: No  Medication/supplement changes: None noted  Signs or symptoms of bleeding or clotting: No  Previous INR: Supratherapeutic  Additional findings: None       PLAN     Recommended plan for ongoing change(s) affecting INR     Dosing Instructions: partial hold then decrease your warfarin dose (10.5% change) with next INR in 10 days       Summary  As of 2/24/2025      Full warfarin instructions:  2/25: 5 mg; Otherwise 7.5 mg every Tue, Thu, Sat; 5 mg all other days   Next INR check:  3/6/2025               In person    Patient offered & declined to schedule next visit    Education provided: Please call back if any changes to your diet, medications or how you've been taking warfarin and Monitoring for bleeding signs and symptoms    Plan made per [facility] anticoagulation protocol    Patti Masterson RN  Anticoagulation Clinic  2/24/2025    _______________________________________________________________________     Anticoagulation Episode Summary       Current INR goal:  2.0-3.0   TTR:  51.2% (1 y)   Target end date:  Indefinite   Send INR reminders to:  [facility]    Indications    History of ST elevation myocardial infarction (STEMI) - 12/10/2023 - [facility] [I25.2]  Long term current use of anticoagulant therapy [Z79.01]             Comments:  --             Anticoagulation Care Providers       Provider Role Specialty Phone number    Lazaro Perez MD Referring Family Medicine [phone]

## 2025-03-06 ENCOUNTER — ANTICOAGULATION THERAPY VISIT (OUTPATIENT)
Dept: ANTICOAGULATION | Facility: OTHER | Age: 55
End: 2025-03-06
Attending: FAMILY MEDICINE
Payer: MEDICAID

## 2025-03-06 DIAGNOSIS — Z79.01 LONG TERM CURRENT USE OF ANTICOAGULANT THERAPY: ICD-10-CM

## 2025-03-06 DIAGNOSIS — I25.2 HISTORY OF ST ELEVATION MYOCARDIAL INFARCTION (STEMI): Primary | ICD-10-CM

## 2025-03-06 LAB — INR POINT OF CARE: 2.8 (ref 0.9–1.1)

## 2025-03-06 PROCEDURE — 85610 PROTHROMBIN TIME: CPT | Mod: ZL,QW

## 2025-03-06 NOTE — PROGRESS NOTES
ANTICOAGULATION MANAGEMENT     Bhaskar Zurita 54 year old male is on warfarin with therapeutic INR result. (Goal INR 2.0-3.0)    Recent labs: (last 7 days)     03/06/25  0814   INR 2.8*       ASSESSMENT     Source(s): Chart Review and In person      Warfarin doses taken: Warfarin taken as instructed  Diet: Change in alcohol intake may be affecting INR. Patient said he went back to drinking less in the last week   New illness, injury, or hospitalization: No  Medication/supplement changes: None noted  Signs or symptoms of bleeding or clotting: No  Previous INR: Supratherapeutic  Additional findings: None       PLAN     Recommended plan for ongoing change(s) affecting INR     Dosing Instructions: Continue your current warfarin dose with next INR in 2 weeks       Summary  As of 3/6/2025      Full warfarin instructions:  7.5 mg every Tue, Thu, Sat; 5 mg all other days   Next INR check:  3/20/2025               In person    Lab visit scheduled    Education provided: Please call back if any changes to your diet, medications or how you've been taking warfarin and Potential interaction between warfarin and alcohol    Plan made per [facility] anticoagulation protocol    Patti Masterson RN  Anticoagulation Clinic  3/6/2025    _______________________________________________________________________     Anticoagulation Episode Summary       Current INR goal:  2.0-3.0   TTR:  50.2% (1 y)   Target end date:  Indefinite   Send INR reminders to:  [facility]    Indications    History of ST elevation myocardial infarction (STEMI) - 12/10/2023 - [facility] [I25.2]  Long term current use of anticoagulant therapy [Z79.01]             Comments:  --             Anticoagulation Care Providers       Provider Role Specialty Phone number    Lazaro Perez MD Referring Family Medicine [phone]

## 2025-03-20 ENCOUNTER — ANTICOAGULATION THERAPY VISIT (OUTPATIENT)
Dept: ANTICOAGULATION | Facility: OTHER | Age: 55
End: 2025-03-20
Attending: FAMILY MEDICINE
Payer: MEDICAID

## 2025-03-20 DIAGNOSIS — Z79.01 LONG TERM CURRENT USE OF ANTICOAGULANT THERAPY: ICD-10-CM

## 2025-03-20 DIAGNOSIS — I25.2 HISTORY OF ST ELEVATION MYOCARDIAL INFARCTION (STEMI): Primary | ICD-10-CM

## 2025-03-20 LAB — INR POINT OF CARE: 2.8 (ref 0.9–1.1)

## 2025-03-20 PROCEDURE — 85610 PROTHROMBIN TIME: CPT | Mod: ZL,QW

## 2025-03-20 PROCEDURE — 36416 COLLJ CAPILLARY BLOOD SPEC: CPT | Mod: ZL

## 2025-03-20 NOTE — PROGRESS NOTES
ANTICOAGULATION MANAGEMENT     Bhaskar Zurita 54 year old male is on warfarin with therapeutic INR result. (Goal INR 2.0-3.0)    Recent labs: (last 7 days)     03/20/25  0803   INR 2.8*       ASSESSMENT     Source(s): Chart Review and In person      Warfarin doses taken: Warfarin taken as instructed  Diet: No new diet changes identified  New illness, injury, or hospitalization: No  Medication/supplement changes: None noted  Signs or symptoms of bleeding or clotting: No  Previous INR: Therapeutic last visit; previously outside of goal range  Additional findings: None       PLAN     Recommended plan for no diet, medication or health factor changes affecting INR     Dosing Instructions: Continue your current warfarin dose with next INR in 3 weeks       Summary  As of 3/20/2025      Full warfarin instructions:  7.5 mg every Tue, Thu, Sat; 5 mg all other days   Next INR check:  4/10/2025               In person    Lab visit scheduled    Education provided: Please call back if any changes to your diet, medications or how you've been taking warfarin    Plan made per [facility] anticoagulation protocol    Patti Masterson RN  Anticoagulation Clinic  3/20/2025    _______________________________________________________________________     Anticoagulation Episode Summary       Current INR goal:  2.0-3.0   TTR:  50.1% (1 y)   Target end date:  Indefinite   Send INR reminders to:  [facility]    Indications    History of ST elevation myocardial infarction (STEMI) - 12/10/2023 - [facility] [I25.2]  Long term current use of anticoagulant therapy [Z79.01]             Comments:  --             Anticoagulation Care Providers       Provider Role Specialty Phone number    Lazaro Perez MD Referring Family Medicine [phone]

## 2025-04-10 ENCOUNTER — ANTICOAGULATION THERAPY VISIT (OUTPATIENT)
Dept: ANTICOAGULATION | Facility: OTHER | Age: 55
End: 2025-04-10
Attending: FAMILY MEDICINE
Payer: MEDICAID

## 2025-04-10 DIAGNOSIS — I25.2 HISTORY OF ST ELEVATION MYOCARDIAL INFARCTION (STEMI): Primary | ICD-10-CM

## 2025-04-10 DIAGNOSIS — Z79.01 LONG TERM CURRENT USE OF ANTICOAGULANT THERAPY: ICD-10-CM

## 2025-04-10 LAB — INR POINT OF CARE: 4.3 (ref 0.9–1.1)

## 2025-04-10 PROCEDURE — 85610 PROTHROMBIN TIME: CPT | Mod: ZL,QW

## 2025-04-10 NOTE — PROGRESS NOTES
ANTICOAGULATION MANAGEMENT     Bhaskar Zurita 54 year old male is on warfarin with supratherapeutic INR result. (Goal INR 2.0-3.0)    Recent labs: (last 7 days)     04/10/25  0814   INR 4.3*       ASSESSMENT     Source(s):  In person       Warfarin doses taken: Warfarin taken as instructed  Diet: Change in alcohol intake may be affecting INR. Reports drinking helps him sleep. Writer asked patient how much alcohol and patients states enough to get a good buzz. Writer also noted to smell marijuana.   Medication/supplement changes: None noted  New illness, injury, or hospitalization: No  Signs or symptoms of bleeding or clotting: No  Previous result: Therapeutic last 2(+) visits  Additional findings: None       PLAN     Recommended plan for temporary change(s) affecting INR     Dosing Instructions: hold dose then continue your current warfarin dose with next INR in 10 days       Summary  As of 4/10/2025      Full warfarin instructions:  4/10: Hold; Otherwise 7.5 mg every Tue, Thu, Sat; 5 mg all other days   Next INR check:  4/21/2025               IN person    Lab visit scheduled    Education provided: Please call back if any changes to your diet, medications or how you've been taking warfarin  Symptom monitoring: monitoring for bleeding signs and symptoms    Plan made per [facility] anticoagulation protocol    Aiden Parker RN  4/10/2025  Anticoagulation Clinic  [employer] for routing messages: p [facility]          _______________________________________________________________________     Anticoagulation Episode Summary       Current INR goal:  2.0-3.0   TTR:  48.2% (1 y)   Target end date:  Indefinite   Send INR reminders to:  [facility]    Indications    History of ST elevation myocardial infarction (STEMI) - 12/10/2023 - [facility] [I25.2]  Long term current use of anticoagulant therapy [Z79.01]             Comments:  --             Anticoagulation Care Providers       Provider Role Specialty Phone  number    Lazaro Perez MD [facility] Family Medicine [phone]

## 2025-05-29 ENCOUNTER — ANTICOAGULATION THERAPY VISIT (OUTPATIENT)
Dept: ANTICOAGULATION | Facility: OTHER | Age: 55
End: 2025-05-29
Attending: FAMILY MEDICINE
Payer: MEDICAID

## 2025-05-29 DIAGNOSIS — Z79.01 LONG TERM CURRENT USE OF ANTICOAGULANT THERAPY: ICD-10-CM

## 2025-05-29 DIAGNOSIS — I25.2 HISTORY OF ST ELEVATION MYOCARDIAL INFARCTION (STEMI): Primary | ICD-10-CM

## 2025-05-29 LAB — INR POINT OF CARE: 2.7 (ref 0.9–1.1)

## 2025-05-29 PROCEDURE — 36416 COLLJ CAPILLARY BLOOD SPEC: CPT | Mod: ZL

## 2025-05-29 NOTE — PROGRESS NOTES
ANTICOAGULATION MANAGEMENT     Bhaskar Zurita 54 year old male is on warfarin with therapeutic INR result. (Goal INR 2.0-3.0)    Recent labs: (last 7 days)     05/29/25  0906   INR 2.7*       ASSESSMENT     Source(s): Chart Review and In person     Warfarin doses taken: More warfarin taken than planned which may be affecting INR  Diet: No new diet changes identified  New illness, injury, or hospitalization: No  Medication/supplement changes: entresto restarted on 5/20/25 No interaction anticipated  Signs or symptoms of bleeding or clotting: No  Previous INR: Therapeutic last visit; previously outside of goal range  Additional findings: None       PLAN     Recommended plan for no diet, medication or health factor changes affecting INR     Dosing Instructions: Increase your warfarin dose (5.9% change) to match the dose the patient self dosed himself with next INR in 5 weeks       Summary  As of 5/29/2025      Full warfarin instructions:  5 mg every Mon, Thu, Sat; 7.5 mg all other days   Next INR check:  7/3/2025               In person    Lab visit scheduled    Education provided: Please call back if any changes to your diet, medications or how you've been taking warfarin    Plan made per [facility] anticoagulation protocol    Patti Masterson, RN  Anticoagulation Clinic  5/29/2025    _______________________________________________________________________     Anticoagulation Episode Summary       Current INR goal:  2.0-3.0   TTR:  53.6% (1 y)   Target end date:  Indefinite   Send INR reminders to:   ANTICOAGULATION NURSE    Indications    History of ST elevation myocardial infarction (STEMI) - 12/10/2023 - [facility] [I25.2]  Long term current use of anticoagulant therapy [Z79.01]             Comments:  --             Anticoagulation Care Providers       Provider Role Specialty Phone number    Lazaro Perez MD Referring Family Medicine [phone]

## 2025-07-03 ENCOUNTER — ANTICOAGULATION THERAPY VISIT (OUTPATIENT)
Dept: ANTICOAGULATION | Facility: OTHER | Age: 55
End: 2025-07-03
Attending: FAMILY MEDICINE
Payer: MEDICAID

## 2025-07-03 DIAGNOSIS — Z79.01 LONG TERM CURRENT USE OF ANTICOAGULANT THERAPY: ICD-10-CM

## 2025-07-03 DIAGNOSIS — I25.2 HISTORY OF ST ELEVATION MYOCARDIAL INFARCTION (STEMI): Primary | ICD-10-CM

## 2025-07-03 LAB — INR POINT OF CARE: 2.6 (ref 0.9–1.1)

## 2025-07-03 PROCEDURE — 85610 PROTHROMBIN TIME: CPT | Mod: ZL,QW

## 2025-07-03 NOTE — PROGRESS NOTES
ANTICOAGULATION MANAGEMENT     Bhaskar Zurita 54 year old male is on warfarin with therapeutic INR result. (Goal INR 2.0-3.0)    Recent labs: (last 7 days)     07/03/25  0827   INR 2.6*       ASSESSMENT     Source(s): Chart Review and in person     Warfarin doses taken: Warfarin taken as instructed  Diet: No new diet changes identified  Medication/supplement changes: None noted  New illness, injury, or hospitalization: No  Signs or symptoms of bleeding or clotting: No  Previous result: Therapeutic last 2(+) visits  Additional findings: None       PLAN     Recommended plan for no diet, medication or health factor changes affecting INR     Dosing Instructions: Continue your current warfarin dose with next INR in 6 weeks       Summary  As of 7/3/2025      Full warfarin instructions:  5 mg every Mon, Thu, Sat; 7.5 mg all other days   Next INR check:  8/14/2025               Instructions given in person, patient verbalizes understanding.      Lab visit scheduled    Education provided: Please call back if any changes to your diet, medications or how you've been taking warfarin    Plan made per [facility] anticoagulation protocol    Maira Wynne RN  7/3/2025  Anticoagulation Clinic  [employer] for routing messages: p  ANTICOAGULATION NURSE  [facility] patient phone line: [phone]        _______________________________________________________________________     Anticoagulation Episode Summary       Current INR goal:  2.0-3.0   TTR:  58.6% (1 y)   Target end date:  Indefinite   Send INR reminders to:   ANTICOAGULATION NURSE    Indications    History of ST elevation myocardial infarction (STEMI) - 12/10/2023 - [facility] [I25.2]  Long term current use of anticoagulant therapy [Z79.01]             Comments:  --             Anticoagulation Care Providers       Provider Role Specialty Phone number    Lazaro Perez MD Referring Family Medicine [phone]

## 2025-08-14 ENCOUNTER — ANTICOAGULATION THERAPY VISIT (OUTPATIENT)
Dept: ANTICOAGULATION | Facility: OTHER | Age: 55
End: 2025-08-14
Attending: FAMILY MEDICINE
Payer: MEDICAID

## 2025-08-14 DIAGNOSIS — I25.2 HISTORY OF ST ELEVATION MYOCARDIAL INFARCTION (STEMI): Primary | ICD-10-CM

## 2025-08-14 DIAGNOSIS — Z79.01 LONG TERM CURRENT USE OF ANTICOAGULANT THERAPY: ICD-10-CM

## 2025-08-14 LAB — INR POINT OF CARE: 3.9 (ref 0.9–1.1)

## 2025-08-14 PROCEDURE — 85610 PROTHROMBIN TIME: CPT | Mod: ZL,QW

## (undated) RX ORDER — MORPHINE SULFATE 2 MG/ML
INJECTION, SOLUTION INTRAMUSCULAR; INTRAVENOUS
Status: DISPENSED
Start: 2023-12-28

## (undated) RX ORDER — MAGNESIUM SULFATE HEPTAHYDRATE 40 MG/ML
INJECTION, SOLUTION INTRAVENOUS
Status: DISPENSED
Start: 2021-10-24

## (undated) RX ORDER — TRIAMCINOLONE ACETONIDE 40 MG/ML
INJECTION, SUSPENSION INTRA-ARTICULAR; INTRAMUSCULAR
Status: DISPENSED
Start: 2022-01-31

## (undated) RX ORDER — NITROGLYCERIN 0.4 MG/1
TABLET SUBLINGUAL
Status: DISPENSED
Start: 2024-04-06

## (undated) RX ORDER — LORAZEPAM 2 MG/ML
INJECTION INTRAMUSCULAR
Status: DISPENSED
Start: 2021-10-24

## (undated) RX ORDER — KETAMINE HYDROCHLORIDE 100 MG/ML
INJECTION, SOLUTION INTRAMUSCULAR; INTRAVENOUS
Status: DISPENSED
Start: 2021-10-24

## (undated) RX ORDER — DEXAMETHASONE SODIUM PHOSPHATE 10 MG/ML
INJECTION, SOLUTION INTRAMUSCULAR; INTRAVENOUS
Status: DISPENSED
Start: 2022-08-30

## (undated) RX ORDER — LIDOCAINE HYDROCHLORIDE 10 MG/ML
INJECTION, SOLUTION INFILTRATION; PERINEURAL
Status: DISPENSED
Start: 2022-01-31

## (undated) RX ORDER — ACETAMINOPHEN 325 MG/1
TABLET ORAL
Status: DISPENSED
Start: 2023-12-28

## (undated) RX ORDER — PANTOPRAZOLE SODIUM 40 MG/10ML
INJECTION, POWDER, LYOPHILIZED, FOR SOLUTION INTRAVENOUS
Status: DISPENSED
Start: 2023-12-28

## (undated) RX ORDER — ACETAMINOPHEN 500 MG
TABLET ORAL
Status: DISPENSED
Start: 2023-12-28

## (undated) RX ORDER — ASPIRIN 81 MG/1
TABLET, CHEWABLE ORAL
Status: DISPENSED
Start: 2023-12-28

## (undated) RX ORDER — DIPHENHYDRAMINE HYDROCHLORIDE 50 MG/ML
INJECTION INTRAMUSCULAR; INTRAVENOUS
Status: DISPENSED
Start: 2021-10-24

## (undated) RX ORDER — OLANZAPINE 10 MG/2ML
INJECTION, POWDER, FOR SOLUTION INTRAMUSCULAR
Status: DISPENSED
Start: 2021-10-24

## (undated) RX ORDER — WATER 10 ML/10ML
INJECTION INTRAMUSCULAR; INTRAVENOUS; SUBCUTANEOUS
Status: DISPENSED
Start: 2021-10-24

## (undated) RX ORDER — HYDROMORPHONE HCL IN WATER/PF 6 MG/30 ML
PATIENT CONTROLLED ANALGESIA SYRINGE INTRAVENOUS
Status: DISPENSED
Start: 2023-12-28

## (undated) RX ORDER — HEPARIN SODIUM 10000 [USP'U]/100ML
INJECTION, SOLUTION INTRAVENOUS
Status: DISPENSED
Start: 2023-12-10

## (undated) RX ORDER — NITROGLYCERIN 0.4 MG/1
TABLET SUBLINGUAL
Status: DISPENSED
Start: 2023-12-28